# Patient Record
Sex: MALE | Race: WHITE | Employment: FULL TIME | URBAN - METROPOLITAN AREA
[De-identification: names, ages, dates, MRNs, and addresses within clinical notes are randomized per-mention and may not be internally consistent; named-entity substitution may affect disease eponyms.]

---

## 2017-01-30 ENCOUNTER — OFFICE VISIT (OUTPATIENT)
Dept: INTERNAL MEDICINE CLINIC | Facility: CLINIC | Age: 47
End: 2017-01-30

## 2017-01-30 VITALS
SYSTOLIC BLOOD PRESSURE: 120 MMHG | HEIGHT: 69 IN | BODY MASS INDEX: 25.62 KG/M2 | TEMPERATURE: 98 F | HEART RATE: 73 BPM | DIASTOLIC BLOOD PRESSURE: 80 MMHG | WEIGHT: 173 LBS

## 2017-01-30 DIAGNOSIS — R19.7 DIARRHEA, UNSPECIFIED TYPE: Primary | ICD-10-CM

## 2017-01-30 PROCEDURE — 99213 OFFICE O/P EST LOW 20 MIN: CPT | Performed by: INTERNAL MEDICINE

## 2017-01-30 PROCEDURE — 99212 OFFICE O/P EST SF 10 MIN: CPT | Performed by: INTERNAL MEDICINE

## 2017-01-30 RX ORDER — OMEPRAZOLE 20 MG/1
CAPSULE, DELAYED RELEASE ORAL
Qty: 30 CAPSULE | Refills: 0 | Status: SHIPPED | OUTPATIENT
Start: 2017-01-30 | End: 2017-05-17

## 2017-01-30 NOTE — PATIENT INSTRUCTIONS
Please drink extra fluids to avoid dehydration. Follow a bland diet and avoid milk and dairy products. Use Imodium to help control diarrhea. Call if not soon better.

## 2017-01-30 NOTE — PROGRESS NOTES
Tyler Omer is a 42year old male. Patient presents with:  Diarrhea  Medication Request: Pt requesting refill on omeprazole    HPI:   Since Saturday morning, 2 days ago, he has had persistent frequent watery diarrhea.   In addition, he also notes mild na prescription refill for omeprazole sent to pharmacy. He will follow-up with his usual PCP soon. The patient indicates understanding of these issues and agrees to the plan. The patient is asked to return as needed.     Gladys Jerez MD  1/30/2017  3:

## 2017-05-17 ENCOUNTER — OFFICE VISIT (OUTPATIENT)
Dept: INTERNAL MEDICINE CLINIC | Facility: CLINIC | Age: 47
End: 2017-05-17

## 2017-05-17 ENCOUNTER — LAB ENCOUNTER (OUTPATIENT)
Dept: LAB | Facility: HOSPITAL | Age: 47
End: 2017-05-17
Attending: INTERNAL MEDICINE
Payer: COMMERCIAL

## 2017-05-17 VITALS
WEIGHT: 173 LBS | BODY MASS INDEX: 25.62 KG/M2 | HEIGHT: 69 IN | HEART RATE: 78 BPM | SYSTOLIC BLOOD PRESSURE: 105 MMHG | RESPIRATION RATE: 18 BRPM | DIASTOLIC BLOOD PRESSURE: 69 MMHG

## 2017-05-17 DIAGNOSIS — Z00.00 ROUTINE HEALTH MAINTENANCE: ICD-10-CM

## 2017-05-17 DIAGNOSIS — G89.29 CHRONIC LOW BACK PAIN, UNSPECIFIED BACK PAIN LATERALITY, WITH SCIATICA PRESENCE UNSPECIFIED: ICD-10-CM

## 2017-05-17 DIAGNOSIS — H66.91 OTITIS, RIGHT: ICD-10-CM

## 2017-05-17 DIAGNOSIS — Z00.00 ROUTINE HEALTH MAINTENANCE: Primary | ICD-10-CM

## 2017-05-17 DIAGNOSIS — K21.9 GASTROESOPHAGEAL REFLUX DISEASE WITHOUT ESOPHAGITIS: ICD-10-CM

## 2017-05-17 DIAGNOSIS — I10 ESSENTIAL HYPERTENSION: ICD-10-CM

## 2017-05-17 DIAGNOSIS — M54.5 CHRONIC LOW BACK PAIN, UNSPECIFIED BACK PAIN LATERALITY, WITH SCIATICA PRESENCE UNSPECIFIED: ICD-10-CM

## 2017-05-17 PROCEDURE — 36415 COLL VENOUS BLD VENIPUNCTURE: CPT

## 2017-05-17 PROCEDURE — 80048 BASIC METABOLIC PNL TOTAL CA: CPT

## 2017-05-17 PROCEDURE — 99396 PREV VISIT EST AGE 40-64: CPT | Performed by: INTERNAL MEDICINE

## 2017-05-17 PROCEDURE — 99213 OFFICE O/P EST LOW 20 MIN: CPT | Performed by: INTERNAL MEDICINE

## 2017-05-17 PROCEDURE — 85025 COMPLETE CBC W/AUTO DIFF WBC: CPT

## 2017-05-17 PROCEDURE — 83036 HEMOGLOBIN GLYCOSYLATED A1C: CPT

## 2017-05-17 PROCEDURE — 84443 ASSAY THYROID STIM HORMONE: CPT

## 2017-05-17 RX ORDER — OMEPRAZOLE 20 MG/1
CAPSULE, DELAYED RELEASE ORAL
Qty: 30 CAPSULE | Refills: 5 | Status: SHIPPED | OUTPATIENT
Start: 2017-05-17 | End: 2017-11-27

## 2017-05-17 RX ORDER — AZITHROMYCIN 250 MG/1
TABLET, FILM COATED ORAL
Qty: 6 TABLET | Refills: 0 | Status: SHIPPED | OUTPATIENT
Start: 2017-05-17 | End: 2017-12-16 | Stop reason: ALTCHOICE

## 2017-05-17 RX ORDER — LISINOPRIL AND HYDROCHLOROTHIAZIDE 20; 12.5 MG/1; MG/1
1 TABLET ORAL
Qty: 30 TABLET | Refills: 5 | Status: SHIPPED | OUTPATIENT
Start: 2017-05-17 | End: 2017-11-27

## 2017-05-17 RX ORDER — TRAMADOL HYDROCHLORIDE 50 MG/1
50 TABLET ORAL 2 TIMES DAILY PRN
Qty: 60 TABLET | Refills: 3 | Status: SHIPPED | OUTPATIENT
Start: 2017-05-17 | End: 2017-12-16

## 2017-05-17 NOTE — PROGRESS NOTES
HPI:    Patient ID: April Coronado is a 42year old male. HPI Comments: Pt is here for a physical.    The past few days his ear has been bothering her. His nose has been congested. His right ear hurts.   If he doesn't take the omeprazole he gets heartb Negative for food allergies. Neurological: Negative for headaches. Psychiatric/Behavioral: Negative for depressed mood. The patient is not nervous/anxious.              Current Outpatient Prescriptions:  omeprazole 20 MG Oral Capsule Delayed Release ALEXANDER Routine health maintenance  Unremarkable exam.  Pt is up to date on immunizations. Counseled regarding exercise and healthy diet. Recommended annual flu vaccine  - BMP; Future  - CBC With Differential With Platelet;  Future  - TSH W Reflex To Free T4 [E];

## 2017-05-17 NOTE — PATIENT INSTRUCTIONS
Non-fasting labs today. Take Sudafed (pseudoephedrine) as needed. You get it from the pharmacist, because it is not over-the-counter.

## 2017-11-27 DIAGNOSIS — K21.9 GASTROESOPHAGEAL REFLUX DISEASE WITHOUT ESOPHAGITIS: ICD-10-CM

## 2017-11-27 DIAGNOSIS — I10 ESSENTIAL HYPERTENSION: ICD-10-CM

## 2017-11-27 RX ORDER — OMEPRAZOLE 20 MG/1
CAPSULE, DELAYED RELEASE ORAL
Qty: 30 CAPSULE | Refills: 0 | Status: SHIPPED | OUTPATIENT
Start: 2017-11-27 | End: 2017-11-27

## 2017-11-27 RX ORDER — LISINOPRIL AND HYDROCHLOROTHIAZIDE 20; 12.5 MG/1; MG/1
TABLET ORAL
Qty: 30 TABLET | Refills: 0 | Status: SHIPPED | OUTPATIENT
Start: 2017-11-27 | End: 2017-12-16

## 2017-11-27 NOTE — TELEPHONE ENCOUNTER
Pt called in requesting a refill for the following medications:  Pt has an upcoming appt scheduled for 12/16. Current Outpatient Prescriptions:   •  Lisinopril-Hydrochlorothiazide 20-12.5 MG Oral Tab, Take 1 tablet by mouth once daily. , Disp: 30 table

## 2017-11-28 RX ORDER — OMEPRAZOLE 20 MG/1
CAPSULE, DELAYED RELEASE ORAL
Qty: 90 CAPSULE | Refills: 0 | Status: SHIPPED | OUTPATIENT
Start: 2017-11-28 | End: 2017-12-16

## 2017-12-16 ENCOUNTER — OFFICE VISIT (OUTPATIENT)
Dept: INTERNAL MEDICINE CLINIC | Facility: CLINIC | Age: 47
End: 2017-12-16

## 2017-12-16 VITALS
HEIGHT: 69 IN | WEIGHT: 174 LBS | BODY MASS INDEX: 25.77 KG/M2 | DIASTOLIC BLOOD PRESSURE: 77 MMHG | HEART RATE: 93 BPM | SYSTOLIC BLOOD PRESSURE: 116 MMHG | RESPIRATION RATE: 18 BRPM

## 2017-12-16 DIAGNOSIS — G89.29 CHRONIC LOW BACK PAIN, UNSPECIFIED BACK PAIN LATERALITY, WITH SCIATICA PRESENCE UNSPECIFIED: Primary | ICD-10-CM

## 2017-12-16 DIAGNOSIS — M54.5 CHRONIC LOW BACK PAIN, UNSPECIFIED BACK PAIN LATERALITY, WITH SCIATICA PRESENCE UNSPECIFIED: Primary | ICD-10-CM

## 2017-12-16 DIAGNOSIS — F51.01 PRIMARY INSOMNIA: ICD-10-CM

## 2017-12-16 DIAGNOSIS — K21.9 GASTROESOPHAGEAL REFLUX DISEASE WITHOUT ESOPHAGITIS: ICD-10-CM

## 2017-12-16 DIAGNOSIS — I10 ESSENTIAL HYPERTENSION: ICD-10-CM

## 2017-12-16 PROCEDURE — 99214 OFFICE O/P EST MOD 30 MIN: CPT | Performed by: INTERNAL MEDICINE

## 2017-12-16 PROCEDURE — 99212 OFFICE O/P EST SF 10 MIN: CPT | Performed by: INTERNAL MEDICINE

## 2017-12-16 RX ORDER — LISINOPRIL AND HYDROCHLOROTHIAZIDE 20; 12.5 MG/1; MG/1
TABLET ORAL
Qty: 90 TABLET | Refills: 1 | Status: SHIPPED | OUTPATIENT
Start: 2017-12-16 | End: 2018-02-17

## 2017-12-16 RX ORDER — TRAZODONE HYDROCHLORIDE 50 MG/1
50 TABLET ORAL NIGHTLY
Qty: 30 TABLET | Refills: 3 | Status: SHIPPED | OUTPATIENT
Start: 2017-12-16 | End: 2018-02-17

## 2017-12-16 RX ORDER — TRAMADOL HYDROCHLORIDE 50 MG/1
50 TABLET ORAL 2 TIMES DAILY PRN
Qty: 60 TABLET | Refills: 3 | Status: SHIPPED | OUTPATIENT
Start: 2017-12-16 | End: 2019-02-07

## 2017-12-16 RX ORDER — OMEPRAZOLE 20 MG/1
CAPSULE, DELAYED RELEASE ORAL
Qty: 90 CAPSULE | Refills: 1 | Status: SHIPPED | OUTPATIENT
Start: 2017-12-16 | End: 2018-05-29

## 2017-12-16 NOTE — ASSESSMENT & PLAN NOTE
Patient has tried Tylenol PM, melatonin, and other over-the-counter products, but they have not been effective or have given him side effects. He has chronic persistent insomnia. He would like prescription medication.  We will try trazodone first.  He will

## 2017-12-16 NOTE — PROGRESS NOTES
HPI:    Patient ID: Yohana Salazar is a 41year old male. Pt has chronic insomnia. He has tried the over-the-counter meds. He has trouble falling asleep, but he wakes during the night.   He had restless legs, but that has resolved when he had his veins Yes           0.0 oz/week     Comment: occasionally    Family History   Problem Relation Age of Onset   • Diabetes Father    • Heart Disorder Father    • Cancer Mother      uterine       . /77 (BP Location: Right arm, Patient Position: Sitting, Cuff S needed for Pain. omeprazole 20 MG Oral Capsule Delayed Release 90 capsule 1      Sig: TAKE 1 CAPSULE BY MOUTH EVERY MORNING      Lisinopril-Hydrochlorothiazide 20-12.5 MG Oral Tab 90 tablet 1      Si daily.       TraZODone HCl 50 MG Oral Tab 30 tab

## 2017-12-27 ENCOUNTER — TELEPHONE (OUTPATIENT)
Dept: INTERNAL MEDICINE CLINIC | Facility: CLINIC | Age: 47
End: 2017-12-27

## 2017-12-27 NOTE — TELEPHONE ENCOUNTER
Patient told to call if sleeping medication is not working. Patient said that he even tried taking 2 trazodone but it is  not putting him to sleep but seems to be keeping him up. Asking if there  Is some thing else he can take?

## 2017-12-27 NOTE — TELEPHONE ENCOUNTER
Pt returning call. Pt states took Trazadone prescribed by LV on 12/16/17 as prescribed for 2 nights and then decided to take 2 tabs nightly since was not working. States just stopped it 2 days ago as seemed to be making insomnia worse.  States sleeping issu

## 2017-12-28 RX ORDER — ZOLPIDEM TARTRATE 5 MG/1
5 TABLET ORAL NIGHTLY PRN
Qty: 30 TABLET | Refills: 0 | Status: SHIPPED
Start: 2017-12-28 | End: 2018-02-17

## 2018-01-12 ENCOUNTER — TELEPHONE (OUTPATIENT)
Dept: INTERNAL MEDICINE CLINIC | Facility: CLINIC | Age: 48
End: 2018-01-12

## 2018-01-12 NOTE — TELEPHONE ENCOUNTER
Pt states ambien 5mg did not work well he increased himself to 10 mg which makes him a \" lttle sleepy but not sleeping through the night\"  Medication was refilled 12/28/17 and now only has a few pills left.   Instructed to never self medicate and change d

## 2018-01-12 NOTE — TELEPHONE ENCOUNTER
Pt requesting refill on med below and states almost out of the  medication and states he thinks he need to change the dose to 15 mg or 20 mg due to the Pt taking more than 1 tablet and running out of pills faster.      •  Zolpidem Tartrate (AMBIEN) 5 MG Ora

## 2018-02-13 ENCOUNTER — NURSE TRIAGE (OUTPATIENT)
Dept: OTHER | Age: 48
End: 2018-02-13

## 2018-02-13 NOTE — TELEPHONE ENCOUNTER
Action Requested: Summary for Provider     []  Critical Lab, Recommendations Needed  [] Need Additional Advice  []   FYI    []   Need Orders  [] Need Medications Sent to Pharmacy  [x]  Other     SUMMARY: Appt made for today for evaluation     Patient state

## 2018-02-17 PROBLEM — J30.9 ALLERGIC RHINITIS: Status: ACTIVE | Noted: 2018-02-17

## 2018-02-17 NOTE — PROGRESS NOTES
HPI:    Patient ID: Aniket Huerta is a 41year old male. Pt c/o chronic insomnia  He tried the trazadone which caused side effects, then the Ambien 5. He ended up taking 10 mg of Ambien. It helped him fall asleep, but he didn't stay asleep.   He is no Allergies:  Levaquin [Levofloxa*    Rash     Smoking status: Never Smoker                                                              Smokeless tobacco: Never Used                      Alcohol use: Yes           0.0 oz/week     Comment: occasionally Refills    Lisinopril-Hydrochlorothiazide 20-12.5 MG Oral Tab 90 tablet 1      Si daily. Zolpidem Tartrate ER (AMBIEN CR) 12.5 MG Oral Tab CR 30 tablet 5      Sig: Take 1 tablet (12.5 mg total) by mouth nightly as needed for Sleep.

## 2018-02-17 NOTE — ASSESSMENT & PLAN NOTE
Improved with Ambien 10 mg, but he does not sleep through the night. Will change to long-acting Ambien.

## 2018-02-17 NOTE — ASSESSMENT & PLAN NOTE
Patient gets relief of his symptoms with Claritin, however he does not like to pay for it. I advised him to get generic.

## 2018-05-29 ENCOUNTER — OFFICE VISIT (OUTPATIENT)
Dept: INTERNAL MEDICINE CLINIC | Facility: CLINIC | Age: 48
End: 2018-05-29

## 2018-05-29 ENCOUNTER — NURSE TRIAGE (OUTPATIENT)
Dept: OTHER | Age: 48
End: 2018-05-29

## 2018-05-29 VITALS
DIASTOLIC BLOOD PRESSURE: 64 MMHG | HEART RATE: 82 BPM | WEIGHT: 163.31 LBS | TEMPERATURE: 98 F | HEIGHT: 69 IN | BODY MASS INDEX: 24.19 KG/M2 | SYSTOLIC BLOOD PRESSURE: 96 MMHG

## 2018-05-29 DIAGNOSIS — K21.9 GASTROESOPHAGEAL REFLUX DISEASE WITHOUT ESOPHAGITIS: ICD-10-CM

## 2018-05-29 DIAGNOSIS — J06.9 UPPER RESPIRATORY TRACT INFECTION, UNSPECIFIED TYPE: ICD-10-CM

## 2018-05-29 DIAGNOSIS — K64.4 EXTERNAL HEMORRHOIDS: Primary | ICD-10-CM

## 2018-05-29 DIAGNOSIS — I10 ESSENTIAL HYPERTENSION: ICD-10-CM

## 2018-05-29 PROCEDURE — 99212 OFFICE O/P EST SF 10 MIN: CPT | Performed by: INTERNAL MEDICINE

## 2018-05-29 PROCEDURE — 99214 OFFICE O/P EST MOD 30 MIN: CPT | Performed by: INTERNAL MEDICINE

## 2018-05-29 RX ORDER — OMEPRAZOLE 20 MG/1
CAPSULE, DELAYED RELEASE ORAL
Qty: 90 CAPSULE | Refills: 1 | Status: SHIPPED | OUTPATIENT
Start: 2018-05-29 | End: 2019-07-12

## 2018-05-29 RX ORDER — LISINOPRIL AND HYDROCHLOROTHIAZIDE 20; 12.5 MG/1; MG/1
TABLET ORAL
Qty: 90 TABLET | Refills: 1 | Status: SHIPPED | OUTPATIENT
Start: 2018-05-29 | End: 2021-02-01

## 2018-05-29 NOTE — TELEPHONE ENCOUNTER
Action Requested: Summary for Provider     []  Critical Lab, Recommendations Needed  [x] Need Additional Advice  []   FYI    []   Need Orders  [] Need Medications Sent to Pharmacy  []  Other     SUMMARY: appt scheduled today for \"bulging hemorrhoid\".  Pt

## 2018-05-29 NOTE — PROGRESS NOTES
HPI:    Patient ID: Morteza Lezama is a 41year old male. Pt has had hemorrhoids in the past.  This last one has been bad for a week. Hemorrhoids   This is a recurrent problem. The current episode started in the past 7 days.  The problem occurs co MORNING Disp: 90 capsule Rfl: 1       Allergies:  Levaquin [Levofloxa*    RASH     Smoking status: Never Smoker                                                              Smokeless tobacco: Never Used                      Alcohol use: Yes           0.0 o ibuprofen.

## 2018-06-01 ENCOUNTER — OFFICE VISIT (OUTPATIENT)
Dept: SURGERY | Facility: CLINIC | Age: 48
End: 2018-06-01

## 2018-06-01 VITALS
WEIGHT: 163 LBS | SYSTOLIC BLOOD PRESSURE: 112 MMHG | BODY MASS INDEX: 23.6 KG/M2 | HEIGHT: 69.5 IN | DIASTOLIC BLOOD PRESSURE: 79 MMHG | TEMPERATURE: 97 F | HEART RATE: 73 BPM

## 2018-06-01 DIAGNOSIS — K64.2 GRADE III HEMORRHOIDS: Primary | ICD-10-CM

## 2018-06-01 PROCEDURE — 99203 OFFICE O/P NEW LOW 30 MIN: CPT | Performed by: SURGERY

## 2018-06-01 PROCEDURE — 99212 OFFICE O/P EST SF 10 MIN: CPT | Performed by: SURGERY

## 2018-06-01 NOTE — PROGRESS NOTES
HPI:    Patient ID: Lexii Cardenas is a 41year old male presenting with Patient presents with:  Hemorrhoids: Patient referred by PCP Dr. Krishna Robles for hemorrhoids. 2 weeks. Swelling has not decreased. Preparation H, sitz baths with little to no relief.  Sh Zolpidem Tartrate ER (AMBIEN CR) 12.5 MG Oral Tab CR Take 1 tablet (12.5 mg total) by mouth nightly as needed for Sleep. Disp: 30 tablet Rfl: 5   TraMADol HCl 50 MG Oral Tab Take 1 tablet (50 mg total) by mouth 2 (two) times daily as needed for Pain.  Disp: Discussed in detail with pt and options reviewed - literature provided. For short term treatment of pain and swelling, I recommend sitz baths and topical hydrocortisone cream and tucks pads.   Intermittent stool softener therapy and laxatives may be of bertrand

## 2018-06-13 ENCOUNTER — TELEPHONE (OUTPATIENT)
Dept: SURGERY | Facility: CLINIC | Age: 48
End: 2018-06-13

## 2018-06-13 NOTE — TELEPHONE ENCOUNTER
Contacted patient. He states he has been doing all of the things Dr. Erin Malcolm suggested from last office visit (stool softeners, high fiber diet, increased fluids, sitz baths, not sitting on commode for long periods of time, hydrocortisone cream, tucks pads, even a lidocaine cream) with no relief. He states the hemorrhoid is still there and he is irritated. He has to sit for most of the day due to work. He would like Dr. Erin Malcolm to review his chart and see what his options are as he would like this \"taken care of\". I offered patient appointment on Friday 06/15 for reevaluation. Patient states he does not want to keep coming back to follow up just to get checked. He needs to take time off work and he has very little PTO time left. I informed patient I would relay this message to Dr. Erin Malcolm and will call him back with his recommendations. Patient was thankful, all questions answered at this time.

## 2018-06-13 NOTE — TELEPHONE ENCOUNTER
appt 6-1-18 for a hemorrhoid. Pt still has the hemorrhoid for over a month.   Pt is requesting a call back

## 2018-06-16 ENCOUNTER — HOSPITAL ENCOUNTER (EMERGENCY)
Facility: HOSPITAL | Age: 48
Discharge: HOME | End: 2018-06-16
Attending: EMERGENCY MEDICINE
Payer: COMMERCIAL

## 2018-06-16 ENCOUNTER — APPOINTMENT (OUTPATIENT)
Dept: CT IMAGING | Facility: HOSPITAL | Age: 48
End: 2018-06-16
Attending: EMERGENCY MEDICINE
Payer: COMMERCIAL

## 2018-06-16 VITALS
OXYGEN SATURATION: 96 % | HEIGHT: 70 IN | TEMPERATURE: 98 F | WEIGHT: 165 LBS | HEART RATE: 80 BPM | RESPIRATION RATE: 15 BRPM | SYSTOLIC BLOOD PRESSURE: 106 MMHG | DIASTOLIC BLOOD PRESSURE: 78 MMHG | BODY MASS INDEX: 23.62 KG/M2

## 2018-06-16 DIAGNOSIS — N23 RENAL COLIC: Primary | ICD-10-CM

## 2018-06-16 DIAGNOSIS — N20.1 URETEROLITHIASIS: ICD-10-CM

## 2018-06-16 PROCEDURE — 80076 HEPATIC FUNCTION PANEL: CPT | Performed by: EMERGENCY MEDICINE

## 2018-06-16 PROCEDURE — 74176 CT ABD & PELVIS W/O CONTRAST: CPT | Performed by: EMERGENCY MEDICINE

## 2018-06-16 PROCEDURE — 96361 HYDRATE IV INFUSION ADD-ON: CPT

## 2018-06-16 PROCEDURE — 81001 URINALYSIS AUTO W/SCOPE: CPT | Performed by: EMERGENCY MEDICINE

## 2018-06-16 PROCEDURE — 99284 EMERGENCY DEPT VISIT MOD MDM: CPT

## 2018-06-16 PROCEDURE — 83690 ASSAY OF LIPASE: CPT | Performed by: EMERGENCY MEDICINE

## 2018-06-16 PROCEDURE — 87086 URINE CULTURE/COLONY COUNT: CPT | Performed by: EMERGENCY MEDICINE

## 2018-06-16 PROCEDURE — 80048 BASIC METABOLIC PNL TOTAL CA: CPT | Performed by: EMERGENCY MEDICINE

## 2018-06-16 PROCEDURE — 85025 COMPLETE CBC W/AUTO DIFF WBC: CPT | Performed by: EMERGENCY MEDICINE

## 2018-06-16 PROCEDURE — 96375 TX/PRO/DX INJ NEW DRUG ADDON: CPT

## 2018-06-16 PROCEDURE — 96374 THER/PROPH/DIAG INJ IV PUSH: CPT

## 2018-06-16 RX ORDER — HYDROCODONE BITARTRATE AND ACETAMINOPHEN 5; 325 MG/1; MG/1
1 TABLET ORAL EVERY 6 HOURS PRN
Qty: 10 TABLET | Refills: 0 | Status: SHIPPED | OUTPATIENT
Start: 2018-06-16 | End: 2018-06-19

## 2018-06-16 RX ORDER — KETOROLAC TROMETHAMINE 30 MG/ML
30 INJECTION, SOLUTION INTRAMUSCULAR; INTRAVENOUS ONCE
Status: COMPLETED | OUTPATIENT
Start: 2018-06-16 | End: 2018-06-16

## 2018-06-16 RX ORDER — TAMSULOSIN HYDROCHLORIDE 0.4 MG/1
0.4 CAPSULE ORAL DAILY
Qty: 7 CAPSULE | Refills: 0 | Status: SHIPPED | OUTPATIENT
Start: 2018-06-16 | End: 2018-06-19

## 2018-06-16 RX ORDER — ONDANSETRON 2 MG/ML
4 INJECTION INTRAMUSCULAR; INTRAVENOUS ONCE
Status: COMPLETED | OUTPATIENT
Start: 2018-06-16 | End: 2018-06-16

## 2018-06-16 RX ORDER — HYDROCODONE BITARTRATE AND ACETAMINOPHEN 5; 325 MG/1; MG/1
2 TABLET ORAL ONCE
Status: COMPLETED | OUTPATIENT
Start: 2018-06-16 | End: 2018-06-16

## 2018-06-16 RX ORDER — CEPHALEXIN 500 MG/1
500 CAPSULE ORAL 2 TIMES DAILY
Qty: 14 CAPSULE | Refills: 0 | Status: SHIPPED | OUTPATIENT
Start: 2018-06-16 | End: 2018-06-16

## 2018-06-16 NOTE — ED PROVIDER NOTES
Patient Seen in: [facility] Emergency Department    History   Patient presents with:  Abdomen/Flank Pain (GI/)    Stated Complaint: Right sided back pain    HPI    42 y/o male w/ N/V/D yesterday now today w/ more N/V but new onset R flank pain r mid and lower abdomen areas. No guarding or peritoneal signs. Musculoskeletal: Normal range of motion. No edema or tenderness. Neurological: Alert and oriented to person, place, and time. Skin: Skin is warm and dry.    Nursing note and vitals reviewed INDICATIONS: Patient is complains of right sided flank and groin pain since early this am. Generalized abdominal and pelvic pain. History of gastroesophageal reflux disease and hypertension.  Leukocytosis with a  TECHNIQUE: CT images of the abdomen and pelv mass.  Pelvic organs appropriate for patient age. BONES:   No significant bony lesion or fracture. Disc space narrowing with chronic degenerative disc disease L5-S1. No acute osseous abnormality LUNG BASES: Dependant atelectasis and or scarring.  OTHER: Ne 0    tamsulosin HCl (FLOMAX) 0.4 MG Oral Cap  Take 1 capsule (0.4 mg total) by mouth daily.   Qty: 7 capsule Refills: 0

## 2018-06-18 ENCOUNTER — TELEPHONE (OUTPATIENT)
Dept: SURGERY | Facility: CLINIC | Age: 48
End: 2018-06-18

## 2018-06-18 NOTE — TELEPHONE ENCOUNTER
pt called. Was seen at [address] [place] [address] ED on Saturday 6/16/18, dx w/kidney stones. CT shows 6x7mm partially obstructing  calculus. Please advise  Thank you.

## 2018-06-18 NOTE — TELEPHONE ENCOUNTER
Spoke to patient. Offered f/u tomorrow @ 0800 with Dr. Lizzy Kednall. Patient agreed, appointment made.

## 2018-06-19 ENCOUNTER — TELEPHONE (OUTPATIENT)
Dept: SURGERY | Facility: CLINIC | Age: 48
End: 2018-06-19

## 2018-06-19 ENCOUNTER — OFFICE VISIT (OUTPATIENT)
Dept: SURGERY | Facility: CLINIC | Age: 48
End: 2018-06-19

## 2018-06-19 ENCOUNTER — APPOINTMENT (OUTPATIENT)
Dept: LAB | Facility: HOSPITAL | Age: 48
End: 2018-06-19
Attending: UROLOGY
Payer: COMMERCIAL

## 2018-06-19 ENCOUNTER — HOSPITAL ENCOUNTER (OUTPATIENT)
Dept: GENERAL RADIOLOGY | Facility: HOSPITAL | Age: 48
Discharge: HOME | End: 2018-06-19
Attending: UROLOGY
Payer: COMMERCIAL

## 2018-06-19 VITALS
RESPIRATION RATE: 16 BRPM | SYSTOLIC BLOOD PRESSURE: 111 MMHG | WEIGHT: 165 LBS | HEART RATE: 79 BPM | DIASTOLIC BLOOD PRESSURE: 75 MMHG | HEIGHT: 69.5 IN | BODY MASS INDEX: 23.89 KG/M2

## 2018-06-19 DIAGNOSIS — Z01.818 PREOP EXAMINATION: ICD-10-CM

## 2018-06-19 DIAGNOSIS — N20.0 KIDNEY STONES: Primary | ICD-10-CM

## 2018-06-19 DIAGNOSIS — Z00.00 ROUTINE HEALTH MAINTENANCE: ICD-10-CM

## 2018-06-19 DIAGNOSIS — N20.0 KIDNEY STONES: ICD-10-CM

## 2018-06-19 DIAGNOSIS — Z01.818 PREOP EXAMINATION: Primary | ICD-10-CM

## 2018-06-19 PROCEDURE — 99244 OFF/OP CNSLTJ NEW/EST MOD 40: CPT | Performed by: UROLOGY

## 2018-06-19 PROCEDURE — 84443 ASSAY THYROID STIM HORMONE: CPT

## 2018-06-19 PROCEDURE — 93010 ELECTROCARDIOGRAM REPORT: CPT | Performed by: UROLOGY

## 2018-06-19 PROCEDURE — 80053 COMPREHEN METABOLIC PANEL: CPT

## 2018-06-19 PROCEDURE — 80061 LIPID PANEL: CPT

## 2018-06-19 PROCEDURE — 36415 COLL VENOUS BLD VENIPUNCTURE: CPT

## 2018-06-19 PROCEDURE — 74019 RADEX ABDOMEN 2 VIEWS: CPT | Performed by: UROLOGY

## 2018-06-19 PROCEDURE — 99212 OFFICE O/P EST SF 10 MIN: CPT | Performed by: UROLOGY

## 2018-06-19 PROCEDURE — 93005 ELECTROCARDIOGRAM TRACING: CPT

## 2018-06-19 RX ORDER — TAMSULOSIN HYDROCHLORIDE 0.4 MG/1
0.4 CAPSULE ORAL DAILY
Qty: 14 CAPSULE | Refills: 0 | Status: SHIPPED | OUTPATIENT
Start: 2018-06-19 | End: 2018-07-03

## 2018-06-19 RX ORDER — HYDROCODONE BITARTRATE AND ACETAMINOPHEN 5; 325 MG/1; MG/1
2 TABLET ORAL EVERY 6 HOURS PRN
Qty: 20 TABLET | Refills: 0 | Status: SHIPPED | OUTPATIENT
Start: 2018-06-19 | End: 2019-09-10 | Stop reason: ALTCHOICE

## 2018-06-19 NOTE — PROGRESS NOTES
SUBJECTIVE:  Rafael Valenzuela is a 41year old male who is a new patient to our department, and presents with a chief complaint of urinary stone. He states that the problem is unchanged.  Symptoms include was in the ER 6/16/2018 with severe right flank pain weight gain, weight loss, fever, night sweats, bone pain, malaise and fatigue. Positive for:  None.   ALl other ROS reviewed and otherwise normal.    OBJECTIVE:  /75 (BP Location: Right arm, Patient Position: Sitting, Cuff Size: adult)   Pulse 79   Re emergency room right away. –Erectile dysfunction: This has not been responsive in the past oral medication and he was using intracavernosal injection therapy under the direction of an outside urologist in the past with good control.   I refilled Trimix sta

## 2018-06-19 NOTE — TELEPHONE ENCOUNTER
Is it ok to schedule these procedures or is the patient required to come in to discuss further? Thanks.

## 2018-07-02 DIAGNOSIS — K21.9 GASTROESOPHAGEAL REFLUX DISEASE WITHOUT ESOPHAGITIS: ICD-10-CM

## 2018-07-02 RX ORDER — OMEPRAZOLE 20 MG/1
CAPSULE, DELAYED RELEASE ORAL
Qty: 90 CAPSULE | Refills: 0 | Status: SHIPPED | OUTPATIENT
Start: 2018-07-02 | End: 2018-07-05

## 2018-07-02 NOTE — TELEPHONE ENCOUNTER
Gastrointestional Medication Protocol Passed7/2 10:39 AM   Appointment in past 12 or next 3 months     Medication refilled for 90 days per protocol.

## 2018-07-05 ENCOUNTER — OFFICE VISIT (OUTPATIENT)
Dept: INTERNAL MEDICINE CLINIC | Facility: CLINIC | Age: 48
End: 2018-07-05

## 2018-07-05 VITALS
BODY MASS INDEX: 23.05 KG/M2 | HEART RATE: 91 BPM | WEIGHT: 159.19 LBS | TEMPERATURE: 98 F | HEIGHT: 69.5 IN | SYSTOLIC BLOOD PRESSURE: 97 MMHG | RESPIRATION RATE: 18 BRPM | DIASTOLIC BLOOD PRESSURE: 66 MMHG

## 2018-07-05 DIAGNOSIS — N20.0 KIDNEY STONES: ICD-10-CM

## 2018-07-05 DIAGNOSIS — Z00.00 ROUTINE HEALTH MAINTENANCE: Primary | ICD-10-CM

## 2018-07-05 DIAGNOSIS — I10 ESSENTIAL HYPERTENSION: ICD-10-CM

## 2018-07-05 DIAGNOSIS — R79.89 ELEVATED LIVER FUNCTION TESTS: ICD-10-CM

## 2018-07-05 PROCEDURE — 99396 PREV VISIT EST AGE 40-64: CPT | Performed by: INTERNAL MEDICINE

## 2018-07-05 NOTE — ASSESSMENT & PLAN NOTE
This may be secondary to [place] pt to avoid norco, nsaids, and alcohol. Recheck liver tests in September.

## 2018-07-05 NOTE — PROGRESS NOTES
HPI:    Patient ID: Kennedy Womack is a 41year old male. Pt is here for a physical.    He will have lithotripsy for kidney stones. Review of Systems   Constitutional: Negative for chills and fever.    HENT: Negative for congestion, ear roberth Phentolamine Mesylate 0.6 mg/ ml Disp: 5 mL Rfl: 6   hydrocortisone (ANUSOL-HC) 2.5 % Rectal Cream Place 1 Application rectally 2 (two) times daily.  Disp: 1 Tube Rfl: 3   clotrimazole-betamethasone 1-0.05 % External Cream DONNIE EXT AA BID Disp:  Rfl: 0 Lymphadenopathy:     He has no cervical adenopathy. Right: No inguinal adenopathy present. Left: No inguinal adenopathy present. Neurological: He is alert and oriented to person, place, and time. No cranial nerve deficit.    Skin: Skin is

## 2018-07-11 ENCOUNTER — APPOINTMENT (OUTPATIENT)
Dept: CT IMAGING | Facility: HOSPITAL | Age: 48
End: 2018-07-11
Attending: EMERGENCY MEDICINE
Payer: COMMERCIAL

## 2018-07-11 ENCOUNTER — TELEPHONE (OUTPATIENT)
Dept: SURGERY | Facility: CLINIC | Age: 48
End: 2018-07-11

## 2018-07-11 ENCOUNTER — HOSPITAL ENCOUNTER (EMERGENCY)
Facility: HOSPITAL | Age: 48
Discharge: HOME | End: 2018-07-11
Attending: EMERGENCY MEDICINE
Payer: COMMERCIAL

## 2018-07-11 VITALS
RESPIRATION RATE: 18 BRPM | HEART RATE: 85 BPM | DIASTOLIC BLOOD PRESSURE: 91 MMHG | OXYGEN SATURATION: 99 % | HEIGHT: 70 IN | TEMPERATURE: 98 F | WEIGHT: 160 LBS | BODY MASS INDEX: 22.9 KG/M2 | SYSTOLIC BLOOD PRESSURE: 135 MMHG

## 2018-07-11 DIAGNOSIS — Z98.890 STATUS POST LASER LITHOTRIPSY OF URETERAL CALCULUS: ICD-10-CM

## 2018-07-11 DIAGNOSIS — N20.1 RIGHT URETERAL STONE: Primary | ICD-10-CM

## 2018-07-11 DIAGNOSIS — N20.0 KIDNEY STONES: Primary | ICD-10-CM

## 2018-07-11 LAB
ANION GAP SERPL CALC-SCNC: 10 MMOL/L (ref 0–18)
BASOPHILS # BLD: 0 K/UL (ref 0–0.2)
BASOPHILS NFR BLD: 0 %
BILIRUB UR QL: NEGATIVE
BUN SERPL-MCNC: 20 MG/DL (ref 8–20)
BUN/CREAT SERPL: 16.5 (ref 10–20)
CALCIUM SERPL-MCNC: 8.9 MG/DL (ref 8.5–10.5)
CHLORIDE SERPL-SCNC: 103 MMOL/L (ref 95–110)
CO2 SERPL-SCNC: 25 MMOL/L (ref 22–32)
CREAT SERPL-MCNC: 1.21 MG/DL (ref 0.5–1.5)
EOSINOPHIL # BLD: 0 K/UL (ref 0–0.7)
EOSINOPHIL NFR BLD: 0 %
ERYTHROCYTE [DISTWIDTH] IN BLOOD BY AUTOMATED COUNT: 12.9 % (ref 11–15)
GLUCOSE SERPL-MCNC: 155 MG/DL (ref 70–99)
GLUCOSE UR-MCNC: NEGATIVE MG/DL
HCT VFR BLD AUTO: 43.9 % (ref 41–52)
HGB BLD-MCNC: 15 G/DL (ref 13.5–17.5)
KETONES UR-MCNC: 20 MG/DL
LEUKOCYTE ESTERASE UR QL STRIP.AUTO: NEGATIVE
LYMPHOCYTES # BLD: 1 K/UL (ref 1–4)
LYMPHOCYTES NFR BLD: 6 %
MCH RBC QN AUTO: 30 PG (ref 27–32)
MCHC RBC AUTO-ENTMCNC: 34.2 G/DL (ref 32–37)
MCV RBC AUTO: 87.9 FL (ref 80–100)
MONOCYTES # BLD: 1.2 K/UL (ref 0–1)
MONOCYTES NFR BLD: 7 %
NEUTROPHILS # BLD AUTO: 16.3 K/UL (ref 1.8–7.7)
NEUTROPHILS NFR BLD: 88 %
NITRITE UR QL STRIP.AUTO: NEGATIVE
OSMOLALITY UR CALC.SUM OF ELEC: 292 MOSM/KG (ref 275–295)
PH UR: 7 [PH] (ref 5–8)
PLATELET # BLD AUTO: 283 K/UL (ref 140–400)
PMV BLD AUTO: 8.9 FL (ref 7.4–10.3)
POTASSIUM SERPL-SCNC: 3.6 MMOL/L (ref 3.3–5.1)
PROT UR-MCNC: 100 MG/DL
RBC # BLD AUTO: 5 M/UL (ref 4.5–5.9)
RBC #/AREA URNS AUTO: 450 /HPF
SODIUM SERPL-SCNC: 138 MMOL/L (ref 136–144)
SP GR UR STRIP: 1.02 (ref 1–1.03)
UROBILINOGEN UR STRIP-ACNC: <2
WBC # BLD AUTO: 18.7 K/UL (ref 4–11)
WBC #/AREA URNS AUTO: 2 /HPF

## 2018-07-11 PROCEDURE — 96375 TX/PRO/DX INJ NEW DRUG ADDON: CPT

## 2018-07-11 PROCEDURE — 80048 BASIC METABOLIC PNL TOTAL CA: CPT | Performed by: EMERGENCY MEDICINE

## 2018-07-11 PROCEDURE — 81001 URINALYSIS AUTO W/SCOPE: CPT | Performed by: EMERGENCY MEDICINE

## 2018-07-11 PROCEDURE — 99284 EMERGENCY DEPT VISIT MOD MDM: CPT

## 2018-07-11 PROCEDURE — 85025 COMPLETE CBC W/AUTO DIFF WBC: CPT | Performed by: EMERGENCY MEDICINE

## 2018-07-11 PROCEDURE — 96374 THER/PROPH/DIAG INJ IV PUSH: CPT

## 2018-07-11 PROCEDURE — 96376 TX/PRO/DX INJ SAME DRUG ADON: CPT

## 2018-07-11 PROCEDURE — 74176 CT ABD & PELVIS W/O CONTRAST: CPT | Performed by: EMERGENCY MEDICINE

## 2018-07-11 RX ORDER — TAMSULOSIN HYDROCHLORIDE 0.4 MG/1
0.4 CAPSULE ORAL DAILY
Qty: 7 CAPSULE | Refills: 0 | Status: SHIPPED | OUTPATIENT
Start: 2018-07-11 | End: 2018-07-18

## 2018-07-11 RX ORDER — CEFADROXIL 500 MG/1
500 CAPSULE ORAL 2 TIMES DAILY
Qty: 10 CAPSULE | Refills: 0 | Status: SHIPPED | OUTPATIENT
Start: 2018-07-11 | End: 2018-07-16

## 2018-07-11 RX ORDER — HYDROCODONE BITARTRATE AND ACETAMINOPHEN 5; 325 MG/1; MG/1
1 TABLET ORAL EVERY 8 HOURS PRN
Qty: 25 TABLET | Refills: 0 | Status: SHIPPED | OUTPATIENT
Start: 2018-07-11 | End: 2018-07-18

## 2018-07-11 RX ORDER — ONDANSETRON 2 MG/ML
4 INJECTION INTRAMUSCULAR; INTRAVENOUS ONCE
Status: COMPLETED | OUTPATIENT
Start: 2018-07-11 | End: 2018-07-11

## 2018-07-11 RX ORDER — KETOROLAC TROMETHAMINE 30 MG/ML
30 INJECTION, SOLUTION INTRAMUSCULAR; INTRAVENOUS ONCE
Status: COMPLETED | OUTPATIENT
Start: 2018-07-11 | End: 2018-07-11

## 2018-07-11 RX ORDER — MORPHINE SULFATE 4 MG/ML
4 INJECTION, SOLUTION INTRAMUSCULAR; INTRAVENOUS ONCE
Status: COMPLETED | OUTPATIENT
Start: 2018-07-11 | End: 2018-07-11

## 2018-07-11 NOTE — TELEPHONE ENCOUNTER
Patient is to see me in 2 weeks with a KUB and right lateral oblique 1-2 days prior to the visit. I placed an order in the computer for it.

## 2018-07-11 NOTE — ED PROVIDER NOTES
Patient Seen in: [facility] AND [facility] Emergency Department    History   Patient presents with:  Pain (neurologic)    Stated Complaint:     HPI    The patient is a 44-year-old male with past history of kidney stones who presents now with right flank pain.   Ortiz Cartwright tenderness  Eyes: Nonicteric sclera, no conjunctival injection  ENT: TMs are clear and flat bilaterally.   There is no posterior pharyngeal erythema  Chest: Clear to auscultation, no tenderness  Cardiovascular: Regular rate and rhythm without murmur  Abdome discussed with patient's primary urologist at 1:45 PM.  He recommends a kidney stone protocol CT scan to rule out subcapsular hematoma. If negative, the patient may be given IV Toradol.     The patient had some persistent pain until given IV Toradol from w

## 2018-07-12 NOTE — TELEPHONE ENCOUNTER
I spoke with pt and informed him of DOUG's msg and instructions in his msg below and I told pt the order for DARON is in the system and he does not need an appt.  I also gave pt an appt for the post op visit for Fri. 7/27 at 10 AM.

## 2018-07-13 ENCOUNTER — TELEPHONE (OUTPATIENT)
Dept: SURGERY | Facility: CLINIC | Age: 48
End: 2018-07-13

## 2018-07-13 NOTE — TELEPHONE ENCOUNTER
PVK returned written instructions to have pt go to the ER for tx since pain is severe. Better for him to go sooner rather than tonight. I called pt back to inform him of this and he verbalized understanding and will comply.

## 2018-07-13 NOTE — TELEPHONE ENCOUNTER
RACHEL came to me to inform that he was just paged by this pt and asked that I call him back to see what the issue is. He stated that there was something about a procedure on wed and about abd pain.      I called the pt and determined that he had lithotripsy o

## 2018-07-18 ENCOUNTER — TELEPHONE (OUTPATIENT)
Dept: SURGERY | Facility: CLINIC | Age: 48
End: 2018-07-18

## 2018-07-18 NOTE — TELEPHONE ENCOUNTER
Spoke with DOUG pt and determined that e is still having moderate to severe Rt flank pain that is sharp and radiates from the flank around the body to the Rt. Groin area.  States that he has been taking Norco pretty much every 4-5 hrs and this only alleviate

## 2018-07-18 NOTE — TELEPHONE ENCOUNTER
I tried calling the emerg # listed and I was able to reach the pt and I informed him of BALJEETK's msg below and he verbalized understanding and will comply.

## 2018-07-18 NOTE — TELEPHONE ENCOUNTER
Because of the extent of patient's problems and their severity, please ask  patient to go to the emergency room to be evaluated , because workup can be performed much faster there, and any necessary testing can be performed immediately. .  Thanks, Dr. Dee Falcon

## 2018-07-18 NOTE — TELEPHONE ENCOUNTER
Patient states he is still is a lot of pain and vomiting. Patient states has passed one kidney. Is looking to see if pain medication can be prescribed. Also has not had bowel movement since procedure done 07/11/18. -- call transferred.

## 2018-07-18 NOTE — TELEPHONE ENCOUNTER
Phoned pt three times, however call never connects,wth no ringing or . Home number listed is same and cell.

## 2018-07-26 ENCOUNTER — HOSPITAL ENCOUNTER (OUTPATIENT)
Dept: GENERAL RADIOLOGY | Facility: HOSPITAL | Age: 48
Discharge: HOME | End: 2018-07-26
Attending: UROLOGY
Payer: COMMERCIAL

## 2018-07-26 DIAGNOSIS — N20.0 KIDNEY STONE: ICD-10-CM

## 2018-07-26 PROCEDURE — 74018 RADEX ABDOMEN 1 VIEW: CPT | Performed by: UROLOGY

## 2018-07-27 ENCOUNTER — OFFICE VISIT (OUTPATIENT)
Dept: SURGERY | Facility: CLINIC | Age: 48
End: 2018-07-27
Payer: COMMERCIAL

## 2018-07-27 VITALS
BODY MASS INDEX: 23 KG/M2 | TEMPERATURE: 98 F | SYSTOLIC BLOOD PRESSURE: 111 MMHG | HEART RATE: 73 BPM | WEIGHT: 160 LBS | DIASTOLIC BLOOD PRESSURE: 73 MMHG

## 2018-07-27 DIAGNOSIS — N20.0 KIDNEY STONE: Primary | ICD-10-CM

## 2018-07-27 PROCEDURE — 99212 OFFICE O/P EST SF 10 MIN: CPT | Performed by: UROLOGY

## 2018-07-27 PROCEDURE — 99024 POSTOP FOLLOW-UP VISIT: CPT | Performed by: UROLOGY

## 2018-07-27 NOTE — PROGRESS NOTES
Katheran Schirmer is a 57year old male. HPI:   Patient presents with: Follow - Up: patient presents for f/u on kidney stones      62-year-old male presents in follow-up to [facility] for a 7 mm right proximal obstructing ureteral stone  11/2018.   He has mult Rfl: 0   Tri-Mix Standard (PPP) Injection Solution 0.2 mL by Intracavernosal route as needed. Inject intracavernosally as directed prior to intercourse Tri-Mix Standard Recipe:- Prostaglandin E1 5.88 ug/ml- Papaverine HCI 18mg/ ml- Phentolamine Mesylate 0.

## 2018-09-08 RX ORDER — ZOLPIDEM TARTRATE 5 MG/1
TABLET ORAL
Qty: 30 TABLET | Refills: 2 | OUTPATIENT
Start: 2018-09-08 | End: 2018-12-12

## 2018-09-08 NOTE — TELEPHONE ENCOUNTER
No Protocol on this med. Pending Prescriptions Disp Refills    ZOLPIDEM TARTRATE 5 MG Oral Tab [Pharmacy Med Name: ZOLPIDEM 5MG TABLETS] 30 tablet 0     Sig: TAKE 1 TABLET BY MOUTH NIGHTLY AS NEEDED FOR SLEEP           LOV 7-5-18  LR 2-17-18 # 30 + 5  Unable to refill per protocol. pls advise, thanks.

## 2018-09-21 ENCOUNTER — APPOINTMENT (OUTPATIENT)
Dept: LAB | Facility: HOSPITAL | Age: 48
End: 2018-09-21
Attending: INTERNAL MEDICINE
Payer: COMMERCIAL

## 2018-09-21 DIAGNOSIS — R79.89 ELEVATED LIVER FUNCTION TESTS: ICD-10-CM

## 2018-09-21 LAB
ALBUMIN SERPL BCP-MCNC: 4.1 G/DL (ref 3.5–4.8)
ALP SERPL-CCNC: 64 U/L (ref 32–100)
ALT SERPL-CCNC: 20 U/L (ref 17–63)
AST SERPL-CCNC: 19 U/L (ref 15–41)
BILIRUB DIRECT SERPL-MCNC: 0.1 MG/DL (ref 0–0.2)
BILIRUB SERPL-MCNC: 0.9 MG/DL (ref 0.3–1.2)
HBV CORE AB SERPL QL IA: NONREACTIVE
HBV SURFACE AB SER-ACNC: 3.12 MIU/ML (ref ?–10)
HBV SURFACE AG SERPL QL IA: NONREACTIVE
HBV SURFACE AG SERPL QL IA: NONREACTIVE
HCV AB SERPL QL IA: NONREACTIVE
PROT SERPL-MCNC: 7.9 G/DL (ref 5.9–8.4)

## 2018-09-21 PROCEDURE — 86803 HEPATITIS C AB TEST: CPT

## 2018-09-21 PROCEDURE — 87340 HEPATITIS B SURFACE AG IA: CPT

## 2018-09-21 PROCEDURE — 80076 HEPATIC FUNCTION PANEL: CPT

## 2018-09-21 PROCEDURE — 80500 HEPATITIS B PROFILE: CPT

## 2018-09-21 PROCEDURE — 36415 COLL VENOUS BLD VENIPUNCTURE: CPT

## 2018-09-21 PROCEDURE — 86704 HEP B CORE ANTIBODY TOTAL: CPT

## 2018-09-21 PROCEDURE — 86706 HEP B SURFACE ANTIBODY: CPT

## 2018-10-04 ENCOUNTER — OFFICE VISIT (OUTPATIENT)
Dept: INTERNAL MEDICINE CLINIC | Facility: CLINIC | Age: 48
End: 2018-10-04
Payer: COMMERCIAL

## 2018-10-04 VITALS
HEART RATE: 83 BPM | BODY MASS INDEX: 23.28 KG/M2 | HEIGHT: 69.5 IN | WEIGHT: 160.81 LBS | DIASTOLIC BLOOD PRESSURE: 69 MMHG | SYSTOLIC BLOOD PRESSURE: 101 MMHG

## 2018-10-04 DIAGNOSIS — M94.0 COSTOCHONDRITIS, ACUTE: ICD-10-CM

## 2018-10-04 DIAGNOSIS — K92.1 HEMATOCHEZIA: Primary | ICD-10-CM

## 2018-10-04 PROBLEM — R79.89 ELEVATED LIVER FUNCTION TESTS: Status: RESOLVED | Noted: 2018-07-05 | Resolved: 2018-10-04

## 2018-10-04 PROCEDURE — 99212 OFFICE O/P EST SF 10 MIN: CPT | Performed by: INTERNAL MEDICINE

## 2018-10-04 PROCEDURE — 99214 OFFICE O/P EST MOD 30 MIN: CPT | Performed by: INTERNAL MEDICINE

## 2018-10-04 NOTE — PATIENT INSTRUCTIONS
Costochondritis    Costochondritis is inflammation of a rib or the cartilage that connects a rib to your breastbone (sternum). It causes tenderness, and sometimes chest pain may be sharp or aching, or it may feel like pressure.  Pain may get worse with de Call the healthcare provider right away if you have any of the following:  · Pain that is not relieved by medicine  · Shortness of breath  · Lightheadedness, dizziness, or fainting  · Feeling of irregular heartbeat or fast pulse  Anyone with chest pain krista

## 2018-10-04 NOTE — PROGRESS NOTES
HPI:    Patient ID: Garrett Marcano is a 57year old male. Pt had blood in the stool for the past 3 days. It seems like a lot of blood. It happened 5 times or so. He had in the past, but only a small amount. He wasn't constipated or diarrhea.   He a MG Oral Tab Take 1 tablet (50 mg total) by mouth 2 (two) times daily as needed for Pain.  Disp: 60 tablet Rfl: 3   ZOLPIDEM TARTRATE 5 MG Oral Tab TAKE 1 TABLET BY MOUTH NIGHTLY AS NEEDED FOR SLEEP Disp: 30 tablet Rfl: 2   hydrocortisone (ANUSOL-HC) 2.5 % R pt  F/u if recurs. The patient expressed understanding and agreed with the plan.     Meds This Visit:  Requested Prescriptions      No prescriptions requested or ordered in this encounter

## 2018-12-05 NOTE — H&P
[address] Gastroenterology                                                                                                  Clinic History and Physical     No NIGHTLY AS NEEDED FOR SLEEP Disp: 30 tablet Rfl: 2   HYDROcodone-acetaminophen 5-325 MG Oral Tab Take 2 tablets by mouth every 6 (six) hours as needed.  Disp: 20 tablet Rfl: 0   Tri-Mix Standard (PPP) Injection Solution 0.2 mL by Intracavernosal route as ne and rhythm, the extremities are warm and well perfused   Lung: moves air well; no labored breathing  Abdomen: soft, NTND abdomen with +NABS appreciated, no hepatosplenomegaly appreciated   Back: No CVA tenderness  Skin: dry, warm, no jaundice  Ext: no LE e questions were answered to the patient’s satisfaction. The patient signed informed consent and elected to proceed with colonoscopy with intervention [i.e. polypectomy, stent placement, etc.] as indicated.       Orders This Visit:  No orders of the defined t

## 2018-12-06 ENCOUNTER — OFFICE VISIT (OUTPATIENT)
Dept: GASTROENTEROLOGY | Facility: CLINIC | Age: 48
End: 2018-12-06
Payer: COMMERCIAL

## 2018-12-06 ENCOUNTER — TELEPHONE (OUTPATIENT)
Dept: GASTROENTEROLOGY | Facility: CLINIC | Age: 48
End: 2018-12-06

## 2018-12-06 VITALS
HEIGHT: 69.5 IN | WEIGHT: 159.38 LBS | DIASTOLIC BLOOD PRESSURE: 87 MMHG | BODY MASS INDEX: 23.07 KG/M2 | HEART RATE: 78 BPM | SYSTOLIC BLOOD PRESSURE: 125 MMHG

## 2018-12-06 DIAGNOSIS — K64.9 HEMORRHOIDS, UNSPECIFIED HEMORRHOID TYPE: ICD-10-CM

## 2018-12-06 DIAGNOSIS — K62.5 RECTAL BLEEDING: ICD-10-CM

## 2018-12-06 DIAGNOSIS — R12 HEARTBURN: Primary | ICD-10-CM

## 2018-12-06 DIAGNOSIS — K59.00 CONSTIPATION, UNSPECIFIED CONSTIPATION TYPE: ICD-10-CM

## 2018-12-06 DIAGNOSIS — K62.89 RECTAL PAIN: ICD-10-CM

## 2018-12-06 PROCEDURE — 99244 OFF/OP CNSLTJ NEW/EST MOD 40: CPT | Performed by: PHYSICIAN ASSISTANT

## 2018-12-06 PROCEDURE — 99212 OFFICE O/P EST SF 10 MIN: CPT | Performed by: PHYSICIAN ASSISTANT

## 2018-12-06 RX ORDER — POLYETHYLENE GLYCOL 3350, SODIUM CHLORIDE, SODIUM BICARBONATE, POTASSIUM CHLORIDE 420; 11.2; 5.72; 1.48 G/4L; G/4L; G/4L; G/4L
POWDER, FOR SOLUTION ORAL
Qty: 1 BOTTLE | Refills: 0 | Status: SHIPPED | OUTPATIENT
Start: 2018-12-06 | End: 2021-06-29

## 2018-12-06 NOTE — TELEPHONE ENCOUNTER
Scheduled for:  Colonoscopy - [phone] & EGD - [phone]  Provider Name:  Dr. Slime Eric  Date:  12/12/18  Location:  [facility]  Sedation:  MAC  Time:  (pt is aware that [facility] will call with arrival time)  Prep:  Trilyte, Prep instructions were given to pt in the office,

## 2018-12-06 NOTE — H&P
[address] [facility] [address] Gastroenterology                                                                                                  Clinic History and Physical     Pa gastric cancer  - No family hx of CRC   - No family history of IBD.     Prior endoscopies:  - None     Social Hx:  - No smoking/Social ETOH  - Denies illicit drug use   - Occupation: automotive    - Lives with fiance     History, Medications, Aller needed for Pain. Disp: 60 tablet Rfl: 3   ZOLPIDEM TARTRATE 5 MG Oral Tab TAKE 1 TABLET BY MOUTH NIGHTLY AS NEEDED FOR SLEEP Disp: 30 tablet Rfl: 2   hydrocortisone (ANUSOL-HC) 2.5 % Rectal Cream Place 1 Application rectally 2 (two) times daily.  Disp: 1 Tu evaluation of rectal bleeding, rectal pain, hemorrhoids and GERD. No prior colonoscopy. No anemia noted on most recent labs.     # Rectal Bleeding/Rectal Pain: Differential to include hemorrhoidal, fissure, diverticular, AVM, polyp, IBD or malignancy - low circumstances. The patient has agreed to sign an informed consent and elected to proceed with the procedures with possible intervention [i.e. polypectomy, stent placement, etc.] as indicated.               Orders This Visit:  No orders of the defined types

## 2018-12-06 NOTE — PATIENT INSTRUCTIONS
1. Schedule colonoscopy and upper endoscopy with MAC sedation @ [facility] with Dr. Josi Salinas or Dr. Fernandez Cruz on a Thursday or Friday if possible. 2.  bowel prep from pharmacy - I have prescribed Trilyte split dose preparation.     3. If MAC @ [facility]:

## 2018-12-12 ENCOUNTER — LAB REQUISITION (OUTPATIENT)
Dept: LAB | Facility: HOSPITAL | Age: 48
End: 2018-12-12
Payer: COMMERCIAL

## 2018-12-12 DIAGNOSIS — Z01.89 ENCOUNTER FOR OTHER SPECIFIED SPECIAL EXAMINATIONS: ICD-10-CM

## 2018-12-12 PROCEDURE — 88305 TISSUE EXAM BY PATHOLOGIST: CPT | Performed by: INTERNAL MEDICINE

## 2018-12-12 PROCEDURE — 88312 SPECIAL STAINS GROUP 1: CPT | Performed by: INTERNAL MEDICINE

## 2018-12-12 RX ORDER — ZOLPIDEM TARTRATE 5 MG/1
TABLET ORAL
Qty: 30 TABLET | Refills: 1 | Status: SHIPPED
Start: 2018-12-12 | End: 2018-12-21

## 2018-12-13 ENCOUNTER — TELEPHONE (OUTPATIENT)
Dept: GASTROENTEROLOGY | Facility: CLINIC | Age: 48
End: 2018-12-13

## 2018-12-13 NOTE — TELEPHONE ENCOUNTER
Requested Prescriptions     Pending Prescriptions Disp Refills   • ZOLPIDEM TARTRATE 5 MG Oral Tab [Pharmacy Med Name: ZOLPIDEM 5MG TABLETS] 30 tablet 0     Sig: TAKE 1 TABLET BY MOUTH AT BEDTIME AS NEEDED FOR SLEEP       Last Office Visit with PCP: 10/4/2

## 2018-12-14 NOTE — TELEPHONE ENCOUNTER
Beverly Li MD  [facility] Clinical Staff             GI staff: please place recall for colonoscopy in 5 years      Results letter mailed out to pt today. Colon recall entered for 5 years. Snapshot updated.

## 2018-12-20 DIAGNOSIS — F51.01 PRIMARY INSOMNIA: ICD-10-CM

## 2018-12-20 NOTE — TELEPHONE ENCOUNTER
Pt called in requesting to have his recent refill for Zolpidem sent to his [address] in [place], [place].   Please advise       Current Outpatient Medications:   •  ZOLPIDEM TARTRATE 5 MG Oral Tab, TAKE 1 TABLET BY MOUTH AT BEDTIME AS NEEDED FOR SLEEP

## 2018-12-21 RX ORDER — ZOLPIDEM TARTRATE 5 MG/1
TABLET ORAL
Qty: 30 TABLET | Refills: 1 | OUTPATIENT
Start: 2018-12-21

## 2018-12-21 RX ORDER — ZOLPIDEM TARTRATE 5 MG/1
TABLET ORAL
Qty: 30 TABLET | Refills: 1 | Status: SHIPPED
Start: 2018-12-21 | End: 2018-12-27

## 2018-12-21 NOTE — TELEPHONE ENCOUNTER
Spoke with patient, advised to call Lindsey Pedro and tell them to call [facility] to transfer the prescription, it was faxed on 12/12/18. Patient verbalized understanding.

## 2018-12-21 NOTE — TELEPHONE ENCOUNTER
Gary Gonzalez and spoke with Nito Rod, pharmacist , cancelled prescription and will send a new  One to [facility].     Medication pended for approval, NO PROTOCOL, was initially approved and sent to [facility] and now wants to use [facility]

## 2018-12-21 NOTE — TELEPHONE ENCOUNTER
Pt was calling back stating he called pharmacy [facility] to transfer the prescription to the new pharmacy [address], however, [facility] [place] states this need to be sent over directly from  with a new prescription number.  Since t

## 2018-12-24 DIAGNOSIS — F51.01 PRIMARY INSOMNIA: ICD-10-CM

## 2018-12-24 RX ORDER — ZOLPIDEM TARTRATE 12.5 MG/1
TABLET, FILM COATED, EXTENDED RELEASE ORAL
Qty: 30 TABLET | Refills: 0 | OUTPATIENT
Start: 2018-12-24

## 2018-12-26 NOTE — TELEPHONE ENCOUNTER
Please see note below. Patient has picked up the 5mg tablet prescription, but intended to ask for the 12.5 mg prescription. He is now asking for the 12.5 mg prescription to be phoned into the [facility] in [place] today. Please advise.

## 2018-12-26 NOTE — TELEPHONE ENCOUNTER
Pt called in stating that he requested the wrong doasage for Zolpidem. Pt stated that he usually takes 12.5 mg of Ambien CR. Pt stated that the pharmacy will not take the medication back but he does not want the 5 mg dosage.   Please advise

## 2018-12-27 RX ORDER — ZOLPIDEM TARTRATE 12.5 MG/1
12.5 TABLET, FILM COATED, EXTENDED RELEASE ORAL NIGHTLY PRN
Qty: 30 TABLET | Refills: 1 | OUTPATIENT
Start: 2018-12-27 | End: 2019-02-07

## 2018-12-27 NOTE — TELEPHONE ENCOUNTER
Called pharmacy and called medication into pharmacy with pharmacist Renee Ludwig to notify pt when ready to be picked up

## 2019-01-03 NOTE — TELEPHONE ENCOUNTER
Patient calling stated we sent the incorrect dosage. Zolpidem 5 mg was sent to the pharmacy and he picked it up. His insurance will not cover the Zolpidem 12.5 mg because he picked up the 5 mg and he cannot return the medication.     The patient feels t

## 2019-01-07 NOTE — TELEPHONE ENCOUNTER
Jacob / message, Pt stated he has only 2 pills left  And he did not make the error so why should he have to pay out of pocket   Spoke to Supervisor, will handle this issue

## 2019-01-07 NOTE — TELEPHONE ENCOUNTER
LMTCB  Transfer to triage    Per the pharmacy the Zolpidem 12.5 mg can be picked up on 1/20/19 or he can pay out of pocket or them.

## 2019-01-07 NOTE — TELEPHONE ENCOUNTER
Per CSS, patient is following up regarding the Medication Zolpidem, advised that will send this message to nursing supervisor Suzanne Rather to take a look at it. Message sent via [facility] to [facility].

## 2019-01-07 NOTE — TELEPHONE ENCOUNTER
Patient states that he has tried to return the 5mg pills but the pharmacy would not take them back;   He has not picked up the 12/5 mg pills yet, as he has been informed that the insurance may not cover the cost.    Patient has tried using the 5mg tablets f

## 2019-01-08 NOTE — TELEPHONE ENCOUNTER
Patient advised that this matter is being looked into by the patient experience team.  He will be notified of progress tomroow.

## 2019-01-11 ENCOUNTER — TELEPHONE (OUTPATIENT)
Dept: INTERNAL MEDICINE CLINIC | Facility: CLINIC | Age: 49
End: 2019-01-11

## 2019-01-11 ENCOUNTER — OFFICE VISIT (OUTPATIENT)
Dept: GASTROENTEROLOGY | Facility: CLINIC | Age: 49
End: 2019-01-11
Payer: COMMERCIAL

## 2019-01-11 VITALS
HEIGHT: 69.5 IN | SYSTOLIC BLOOD PRESSURE: 117 MMHG | HEART RATE: 71 BPM | WEIGHT: 153.38 LBS | BODY MASS INDEX: 22.21 KG/M2 | DIASTOLIC BLOOD PRESSURE: 74 MMHG

## 2019-01-11 DIAGNOSIS — Z09 FOLLOW UP: ICD-10-CM

## 2019-01-11 DIAGNOSIS — K21.9 GASTROESOPHAGEAL REFLUX DISEASE, ESOPHAGITIS PRESENCE NOT SPECIFIED: ICD-10-CM

## 2019-01-11 DIAGNOSIS — K64.9 HEMORRHOIDS, UNSPECIFIED HEMORRHOID TYPE: ICD-10-CM

## 2019-01-11 DIAGNOSIS — K62.5 RECTAL BLEEDING: Primary | ICD-10-CM

## 2019-01-11 PROCEDURE — 99214 OFFICE O/P EST MOD 30 MIN: CPT | Performed by: PHYSICIAN ASSISTANT

## 2019-01-11 PROCEDURE — 99212 OFFICE O/P EST SF 10 MIN: CPT | Performed by: PHYSICIAN ASSISTANT

## 2019-01-11 NOTE — PROGRESS NOTES
[address] [facility] [address] Gastroenterology                                                                                                  Clinic History and Physical     Pa Patient gives history of epigastric pain and hematemesis - was diagnosed with gastric ulcers during his teens. Did not have an upper endoscopy at that time.  Endorses that he no longer has epigastric pain nor n/v/hematemesis, but has been taking Omepraz 1   Lisinopril-Hydrochlorothiazide 20-12.5 MG Oral Tab 1 daily.  Disp: 90 tablet Rfl: 1   omeprazole 20 MG Oral Capsule Delayed Release TAKE 1 CAPSULE BY MOUTH EVERY MORNING Disp: 90 capsule Rfl: 1   TraMADol HCl 50 MG Oral Tab Take 1 tablet (50 mg total) b regular rate and rhythm  Lung: moves air well; no labored breathing  Abdomen: soft, NTND abdomen with +NABS appreciated  Skin: dry, warm, no jaundice  Ext: no LE edema is evident.    Neuro: Alert and interactive, and patient is having movements of all 4 ext Ron Santana for hemorrhoid management  - Start fiber   - See above    Orders This Visit:  No orders of the defined types were placed in this encounter.       Meds This Visit:  Requested Prescriptions      No prescriptions requested or ordered in this encounter

## 2019-01-11 NOTE — PATIENT INSTRUCTIONS
1. Referral Dr. Janina Sanchez for hemorrhoid management. Please call my office if you have any issues getting in to see him.     2. Start Benefiber each day - you can also buy the generic from [address] [place] [address] or [facility].    3. Continue Prilosec 20 mg each day 30 mi

## 2019-01-11 NOTE — TELEPHONE ENCOUNTER
Patient came in stating that he was going to be reimburse for Zolpidem because we send in the wrong dosage.  I saw the communication from 12/20 that the patient experience team was going to look into it and be notified but patient states he hasn't received

## 2019-01-18 ENCOUNTER — OFFICE VISIT (OUTPATIENT)
Dept: SURGERY | Facility: CLINIC | Age: 49
End: 2019-01-18
Payer: COMMERCIAL

## 2019-01-18 VITALS — HEIGHT: 69.5 IN | WEIGHT: 155 LBS | BODY MASS INDEX: 22.44 KG/M2

## 2019-01-18 DIAGNOSIS — K60.2 ANAL FISSURE: Primary | ICD-10-CM

## 2019-01-18 DIAGNOSIS — K64.8 INTERNAL HEMORRHOIDS WITH COMPLICATION: ICD-10-CM

## 2019-01-18 PROCEDURE — 99212 OFFICE O/P EST SF 10 MIN: CPT | Performed by: SURGERY

## 2019-01-18 PROCEDURE — 99214 OFFICE O/P EST MOD 30 MIN: CPT | Performed by: SURGERY

## 2019-01-18 NOTE — PROGRESS NOTES
History and Physical      Shannen Mackay is a 57year old male. HPI   Patient presents with:  Hemorrhoids: Patient referred by GI Dr. Leni Lorenzana for hemorrhoids.  Patient states he has experienced bleeding and pain intermittently with BMs for past few Recipe:- Prostaglandin E1 5.88 ug/ml- Papaverine HCI 18mg/ ml- Phentolamine Mesylate 0.6 mg/ ml Disp: 5 mL Rfl: 6   Lisinopril-Hydrochlorothiazide 20-12.5 MG Oral Tab 1 daily.  Disp: 90 tablet Rfl: 1   omeprazole 20 MG Oral Capsule Delayed Release TAKE 1 CA bilaterally normal respiratory effort  Cardiovascular: regular rate and rhythm no murmurs, gallups, or rubs  Abdomen: soft non-tender non-distended no organomegaly noted no masses  Extremities: no edema, cyanosis, or clubbing  Neurological: exam appropriat

## 2019-02-07 PROBLEM — M67.431 GANGLION CYST OF WRIST, RIGHT: Status: ACTIVE | Noted: 2019-02-07

## 2019-02-07 NOTE — PROGRESS NOTES
HPI:    Patient ID: Sebastien Geronimo is a 57year old male. Pt c/o recurrent back pain for the past 2 weeks. He had back surgery in the past.  He would like tramadol. C/o painful lump on his right wrist.  He needs the Ambien every day to sleep.       L Prostaglandin E1 5.88 ug/ml- Papaverine HCI 18mg/ ml- Phentolamine Mesylate 0.6 mg/ ml Disp: 5 mL Rfl: 6   Lisinopril-Hydrochlorothiazide 20-12.5 MG Oral Tab 1 daily.  Disp: 90 tablet Rfl: 1   omeprazole 20 MG Oral Capsule Delayed Release TAKE 1 CAPSULE BY - Primary     Controlled. Continue present management. Relevant Medications    Zolpidem Tartrate ER (AMBIEN CR) 12.5 MG Oral Tab CR       Unprioritized    Essential hypertension     Controlled. Continue present management.          Ganglion cyst o

## 2019-03-22 DIAGNOSIS — I10 ESSENTIAL HYPERTENSION: ICD-10-CM

## 2019-03-22 RX ORDER — LISINOPRIL AND HYDROCHLOROTHIAZIDE 20; 12.5 MG/1; MG/1
TABLET ORAL
Qty: 90 TABLET | Refills: 1 | Status: SHIPPED | OUTPATIENT
Start: 2019-03-22 | End: 2019-09-10

## 2019-03-25 ENCOUNTER — TELEPHONE (OUTPATIENT)
Dept: INTERNAL MEDICINE CLINIC | Facility: CLINIC | Age: 49
End: 2019-03-25

## 2019-03-25 NOTE — TELEPHONE ENCOUNTER
Pt called in stating that he needs a refill on medication below. He only has 1 pill left. Pharmacy on chart confirmed. Please advise.        Current Outpatient Medications:   •  Zolpidem Tartrate ER (AMBIEN CR) 12.5 MG Oral Tab CR, Take 1 tablet (12.5 m

## 2019-03-25 NOTE — TELEPHONE ENCOUNTER
Spoke with pharmacist Tanya and patient rx for Zolpidem is at the [facility] in [place]. Contacted that [facility] spoke with pharmacist Ayesha Blank and advised patient has refills on file and to please refill rx for patient.  Pharmacy will notify patient when

## 2019-04-24 DIAGNOSIS — F51.01 PRIMARY INSOMNIA: ICD-10-CM

## 2019-04-25 RX ORDER — ZOLPIDEM TARTRATE 12.5 MG/1
12.5 TABLET, FILM COATED, EXTENDED RELEASE ORAL NIGHTLY PRN
Qty: 30 TABLET | Refills: 2 | Status: SHIPPED
Start: 2019-04-25 | End: 2019-07-24

## 2019-07-12 DIAGNOSIS — K21.9 GASTROESOPHAGEAL REFLUX DISEASE WITHOUT ESOPHAGITIS: ICD-10-CM

## 2019-07-12 RX ORDER — OMEPRAZOLE 20 MG/1
CAPSULE, DELAYED RELEASE ORAL
Qty: 90 CAPSULE | Refills: 1 | Status: SHIPPED | OUTPATIENT
Start: 2019-07-12 | End: 2020-01-06

## 2019-07-12 NOTE — TELEPHONE ENCOUNTER
Current Outpatient Medications:  omeprazole 20 MG Oral Capsule Delayed Release TAKE 1 CAPSULE BY MOUTH EVERY MORNING Disp: 90 capsule Rfl: 1

## 2019-07-12 NOTE — TELEPHONE ENCOUNTER
Refill passed per [facility], [facility] protocol.   Refill Protocol Appointment Criteria  · Appointment scheduled in the past 12 months or in the next 3 months  Recent Outpatient Visits            5 months ago Primary insomnia    [facility], [facility], [address], [place]

## 2019-07-24 DIAGNOSIS — F51.01 PRIMARY INSOMNIA: ICD-10-CM

## 2019-07-24 RX ORDER — ZOLPIDEM TARTRATE 12.5 MG/1
TABLET, FILM COATED, EXTENDED RELEASE ORAL
Qty: 30 TABLET | Refills: 0 | OUTPATIENT
Start: 2019-07-24 | End: 2019-09-03

## 2019-07-24 NOTE — TELEPHONE ENCOUNTER
Controlled medication pending for review. If approved needs to be called in or faxed by on-site staff.     Last Rx: 04/25/19  LOV: 02/27/19

## 2019-07-24 NOTE — TELEPHONE ENCOUNTER
Rx for Zolpidem Tartrate ER 12.5 mg oral tabl CR called in to [address] #[id number] voicemail as approved by Dr. Juju Harper covering physician for Dr. Misa Balderrama.

## 2019-09-03 DIAGNOSIS — F51.01 PRIMARY INSOMNIA: ICD-10-CM

## 2019-09-04 RX ORDER — ZOLPIDEM TARTRATE 12.5 MG/1
TABLET, FILM COATED, EXTENDED RELEASE ORAL
Qty: 30 TABLET | Refills: 1 | Status: SHIPPED | OUTPATIENT
Start: 2019-09-04 | End: 2019-09-10

## 2019-09-04 RX ORDER — ZOLPIDEM TARTRATE 12.5 MG/1
TABLET, FILM COATED, EXTENDED RELEASE ORAL
Qty: 30 TABLET | Refills: 1 | OUTPATIENT
Start: 2019-09-04 | End: 2019-09-04

## 2019-09-04 NOTE — TELEPHONE ENCOUNTER
Controlled medications pending for review. If approved needs to be called in or faxed by on site staff.     Last Rx: 7-24-19 # 30  LOV: 2-7-19    Requested Prescriptions     Pending Prescriptions Disp Refills   • ZOLPIDEM TARTRATE ER 12.5 MG Oral Tab CR [Ph

## 2019-09-10 ENCOUNTER — OFFICE VISIT (OUTPATIENT)
Dept: INTERNAL MEDICINE CLINIC | Facility: CLINIC | Age: 49
End: 2019-09-10
Payer: COMMERCIAL

## 2019-09-10 ENCOUNTER — LAB ENCOUNTER (OUTPATIENT)
Dept: LAB | Facility: HOSPITAL | Age: 49
End: 2019-09-10
Attending: INTERNAL MEDICINE
Payer: COMMERCIAL

## 2019-09-10 VITALS
SYSTOLIC BLOOD PRESSURE: 113 MMHG | BODY MASS INDEX: 23.15 KG/M2 | HEIGHT: 69.5 IN | WEIGHT: 159.88 LBS | HEART RATE: 89 BPM | DIASTOLIC BLOOD PRESSURE: 79 MMHG

## 2019-09-10 DIAGNOSIS — F51.01 PRIMARY INSOMNIA: ICD-10-CM

## 2019-09-10 DIAGNOSIS — Z00.00 ROUTINE HEALTH MAINTENANCE: ICD-10-CM

## 2019-09-10 DIAGNOSIS — I10 ESSENTIAL HYPERTENSION: ICD-10-CM

## 2019-09-10 DIAGNOSIS — M54.41 CHRONIC BILATERAL LOW BACK PAIN WITH RIGHT-SIDED SCIATICA: ICD-10-CM

## 2019-09-10 DIAGNOSIS — J06.9 UPPER RESPIRATORY TRACT INFECTION, UNSPECIFIED TYPE: Primary | ICD-10-CM

## 2019-09-10 DIAGNOSIS — G89.29 CHRONIC BILATERAL LOW BACK PAIN WITH RIGHT-SIDED SCIATICA: ICD-10-CM

## 2019-09-10 LAB
ALBUMIN SERPL-MCNC: 3.8 G/DL (ref 3.4–5)
ALBUMIN/GLOB SERPL: 1 {RATIO} (ref 1–2)
ALP LIVER SERPL-CCNC: 71 U/L (ref 45–117)
ALT SERPL-CCNC: 24 U/L (ref 16–61)
ANION GAP SERPL CALC-SCNC: 8 MMOL/L (ref 0–18)
AST SERPL-CCNC: 15 U/L (ref 15–37)
BASOPHILS # BLD AUTO: 0.04 X10(3) UL (ref 0–0.2)
BASOPHILS NFR BLD AUTO: 0.3 %
BILIRUB SERPL-MCNC: 0.5 MG/DL (ref 0.1–2)
BUN BLD-MCNC: 16 MG/DL (ref 7–18)
BUN/CREAT SERPL: 13.9 (ref 10–20)
CALCIUM BLD-MCNC: 9.1 MG/DL (ref 8.5–10.1)
CHLORIDE SERPL-SCNC: 105 MMOL/L (ref 98–112)
CO2 SERPL-SCNC: 26 MMOL/L (ref 21–32)
CREAT BLD-MCNC: 1.15 MG/DL (ref 0.7–1.3)
DEPRECATED RDW RBC AUTO: 39.3 FL (ref 35.1–46.3)
EOSINOPHIL # BLD AUTO: 0.21 X10(3) UL (ref 0–0.7)
EOSINOPHIL NFR BLD AUTO: 1.7 %
ERYTHROCYTE [DISTWIDTH] IN BLOOD BY AUTOMATED COUNT: 12.2 % (ref 11–15)
GLOBULIN PLAS-MCNC: 4 G/DL (ref 2.8–4.4)
GLUCOSE BLD-MCNC: 113 MG/DL (ref 70–99)
HCT VFR BLD AUTO: 43.6 % (ref 39–53)
HGB BLD-MCNC: 14.8 G/DL (ref 13–17.5)
IMM GRANULOCYTES # BLD AUTO: 0.04 X10(3) UL (ref 0–1)
IMM GRANULOCYTES NFR BLD: 0.3 %
LYMPHOCYTES # BLD AUTO: 2.12 X10(3) UL (ref 1–4)
LYMPHOCYTES NFR BLD AUTO: 17.3 %
M PROTEIN MFR SERPL ELPH: 7.8 G/DL (ref 6.4–8.2)
MCH RBC QN AUTO: 30 PG (ref 26–34)
MCHC RBC AUTO-ENTMCNC: 33.9 G/DL (ref 31–37)
MCV RBC AUTO: 88.4 FL (ref 80–100)
MONOCYTES # BLD AUTO: 1.3 X10(3) UL (ref 0.1–1)
MONOCYTES NFR BLD AUTO: 10.6 %
NEUTROPHILS # BLD AUTO: 8.51 X10 (3) UL (ref 1.5–7.7)
NEUTROPHILS # BLD AUTO: 8.51 X10(3) UL (ref 1.5–7.7)
NEUTROPHILS NFR BLD AUTO: 69.8 %
OSMOLALITY SERPL CALC.SUM OF ELEC: 290 MOSM/KG (ref 275–295)
PATIENT FASTING: NO
PLATELET # BLD AUTO: 296 10(3)UL (ref 150–450)
POTASSIUM SERPL-SCNC: 3.7 MMOL/L (ref 3.5–5.1)
RBC # BLD AUTO: 4.93 X10(6)UL (ref 4.3–5.7)
SODIUM SERPL-SCNC: 139 MMOL/L (ref 136–145)
WBC # BLD AUTO: 12.2 X10(3) UL (ref 4–11)

## 2019-09-10 PROCEDURE — 36415 COLL VENOUS BLD VENIPUNCTURE: CPT

## 2019-09-10 PROCEDURE — 80053 COMPREHEN METABOLIC PANEL: CPT

## 2019-09-10 PROCEDURE — 99214 OFFICE O/P EST MOD 30 MIN: CPT | Performed by: INTERNAL MEDICINE

## 2019-09-10 PROCEDURE — 85025 COMPLETE CBC W/AUTO DIFF WBC: CPT

## 2019-09-10 RX ORDER — CYCLOBENZAPRINE HCL 10 MG
10 TABLET ORAL NIGHTLY PRN
Qty: 30 TABLET | Refills: 1 | Status: SHIPPED | OUTPATIENT
Start: 2019-09-10 | End: 2021-02-01

## 2019-09-10 RX ORDER — ZOLPIDEM TARTRATE 12.5 MG/1
TABLET, FILM COATED, EXTENDED RELEASE ORAL
Qty: 30 TABLET | Refills: 5 | Status: SHIPPED | OUTPATIENT
Start: 2019-09-10 | End: 2021-02-01 | Stop reason: ALTCHOICE

## 2019-09-10 NOTE — ASSESSMENT & PLAN NOTE
Patient complains of chronic back pain, however the Toradol causes nausea.   I will try giving him a muscle relaxant to take at night, since that is when his pain bothers him the most.

## 2019-09-10 NOTE — ASSESSMENT & PLAN NOTE
Patient likely has a viral upper respiratory infection. He does have sinus pressure and yellow drainage. I advised him to push fluids and to call if he is not better in the next 2 to 3 days.

## 2019-09-10 NOTE — ASSESSMENT & PLAN NOTE
Patient complains of chronic insomnia. I advised him to avoid screen time prior to bed. He states he cannot sleep unless he takes Ambien every night. We will continue the Ambien.

## 2019-10-09 DIAGNOSIS — I10 ESSENTIAL HYPERTENSION: ICD-10-CM

## 2019-10-10 RX ORDER — LISINOPRIL AND HYDROCHLOROTHIAZIDE 20; 12.5 MG/1; MG/1
TABLET ORAL
Qty: 90 TABLET | Refills: 1 | Status: SHIPPED | OUTPATIENT
Start: 2019-10-10 | End: 2020-04-15

## 2019-10-11 NOTE — TELEPHONE ENCOUNTER
Refill passed per [facility], [facility] protocol.   Hypertensive Medications  Protocol Criteria:  · Appointment scheduled in the past 6 months or in the next 3 months  · BMP or CMP in the past 12 months  · Creatinine result < 2  Recent Outpatient Visits

## 2019-11-02 DIAGNOSIS — F51.01 PRIMARY INSOMNIA: ICD-10-CM

## 2019-11-02 RX ORDER — ZOLPIDEM TARTRATE 12.5 MG/1
TABLET, FILM COATED, EXTENDED RELEASE ORAL
Qty: 30 TABLET | OUTPATIENT
Start: 2019-11-02

## 2019-11-05 ENCOUNTER — NURSE TRIAGE (OUTPATIENT)
Dept: INTERNAL MEDICINE CLINIC | Facility: CLINIC | Age: 49
End: 2019-11-05

## 2019-11-05 ENCOUNTER — TELEPHONE (OUTPATIENT)
Dept: INTERNAL MEDICINE CLINIC | Facility: CLINIC | Age: 49
End: 2019-11-05

## 2019-11-05 DIAGNOSIS — F51.01 PRIMARY INSOMNIA: ICD-10-CM

## 2019-11-05 NOTE — TELEPHONE ENCOUNTER
That is odd. It does say that it was received and it sounds like it is the correct pharmacy. Please call it in. Is it possible that it needs prior auth and that is why it didn't go through?

## 2019-11-05 NOTE — TELEPHONE ENCOUNTER
He can see [facility]. [phone] or I can see him in the next available appointment that I have. Please do not use res24. I have some available appointments at [facility]. This is a chronic problem.

## 2019-11-05 NOTE — TELEPHONE ENCOUNTER
Action Requested: Summary for Provider     []  Critical Lab, Recommendations Needed  [] Need Additional Advice  []   FYI    []   Need Orders  [] Need Medications Sent to Pharmacy  []  Other     SUMMARY: Dr Hakeem Crocker, please advise.  Patient is willing to be s

## 2019-11-05 NOTE — TELEPHONE ENCOUNTER
Patient called in stating that he is out of medication below. Pharmacy is requesting a refill, however, the refill was denied in the office. CSS sees that on 9/10 script was sent with 5 refills, but unsure what the Pharmacy has on file.      Confirmed p

## 2019-11-05 NOTE — TELEPHONE ENCOUNTER
Patient had OV on 9/10 with Dr. Sera Delarosa and prescription for Zolpidem 12.5 mg #30 with 5 refills were ordered to [facility] in [place]:    Requested Prescriptions             Signed Prescriptions Disp Refills   • Cyclobenzaprine HCl 10 MG Oral Tab 30 tabl

## 2019-11-05 NOTE — TELEPHONE ENCOUNTER
Patient states that he's been having an issue with his left elbow. He states that it is occasionally with movement but more often when he touches it with blanket/pillow he will have a sharp shooting pain. He states that it comes and goes.      He is req

## 2019-11-05 NOTE — TELEPHONE ENCOUNTER
Called [facility] , spoke with Flavia bernalriied the RX for both ; refills are available , Pharmacist will notify the patient when RX is ready for .

## 2019-11-06 RX ORDER — ZOLPIDEM TARTRATE 12.5 MG/1
TABLET, FILM COATED, EXTENDED RELEASE ORAL
Qty: 30 TABLET | Refills: 1 | Status: SHIPPED | OUTPATIENT
Start: 2019-11-06 | End: 2020-01-31

## 2019-11-07 NOTE — TELEPHONE ENCOUNTER
Controlled medication pending for review. If approved needs to be called in or faxed by on-site staff.      Last Rx: 9/10/19  LOV: 1 month ago

## 2020-01-05 DIAGNOSIS — K21.9 GASTROESOPHAGEAL REFLUX DISEASE WITHOUT ESOPHAGITIS: ICD-10-CM

## 2020-01-06 RX ORDER — OMEPRAZOLE 20 MG/1
CAPSULE, DELAYED RELEASE ORAL
Qty: 90 CAPSULE | Refills: 1 | Status: SHIPPED | OUTPATIENT
Start: 2020-01-06 | End: 2020-07-17

## 2020-01-07 NOTE — TELEPHONE ENCOUNTER
Refill Protocol Appointment Criteria  · Appointment scheduled in the past 12 months or in the next 3 months  Recent Outpatient Visits            3 months ago Upper respiratory tract infection, unspecified type    [facility], [address], [place] [facility]

## 2020-01-31 DIAGNOSIS — F51.01 PRIMARY INSOMNIA: ICD-10-CM

## 2020-01-31 NOTE — TELEPHONE ENCOUNTER
Review pended refill request.  It does not fall under a protocol.      Requested Prescriptions     Pending Prescriptions Disp Refills   • ZOLPIDEM TARTRATE ER 12.5 MG Oral Tab CR [Pharmacy Med Name: ZOLPIDEM ER 12.5MG TABLETS] 30 tablet 0     Sig: TAKE 1 TA

## 2020-02-01 RX ORDER — ZOLPIDEM TARTRATE 12.5 MG/1
TABLET, FILM COATED, EXTENDED RELEASE ORAL
Qty: 90 TABLET | Refills: 1 | Status: SHIPPED | OUTPATIENT
Start: 2020-02-01 | End: 2020-07-20

## 2020-04-07 DIAGNOSIS — F51.01 PRIMARY INSOMNIA: ICD-10-CM

## 2020-04-09 RX ORDER — ZOLPIDEM TARTRATE 12.5 MG/1
TABLET, FILM COATED, EXTENDED RELEASE ORAL
Qty: 30 TABLET | Refills: 0 | OUTPATIENT
Start: 2020-04-09

## 2020-04-15 DIAGNOSIS — I10 ESSENTIAL HYPERTENSION: ICD-10-CM

## 2020-04-15 RX ORDER — LISINOPRIL AND HYDROCHLOROTHIAZIDE 20; 12.5 MG/1; MG/1
TABLET ORAL
Qty: 90 TABLET | Refills: 0 | Status: SHIPPED | OUTPATIENT
Start: 2020-04-15 | End: 2020-07-17

## 2020-07-16 DIAGNOSIS — K21.9 GASTROESOPHAGEAL REFLUX DISEASE WITHOUT ESOPHAGITIS: ICD-10-CM

## 2020-07-16 DIAGNOSIS — I10 ESSENTIAL HYPERTENSION: ICD-10-CM

## 2020-07-17 RX ORDER — LISINOPRIL AND HYDROCHLOROTHIAZIDE 20; 12.5 MG/1; MG/1
TABLET ORAL
Qty: 90 TABLET | Refills: 0 | Status: SHIPPED | OUTPATIENT
Start: 2020-07-17 | End: 2020-10-14

## 2020-07-17 RX ORDER — OMEPRAZOLE 20 MG/1
CAPSULE, DELAYED RELEASE ORAL
Qty: 90 CAPSULE | Refills: 1 | Status: SHIPPED | OUTPATIENT
Start: 2020-07-17 | End: 2021-02-01

## 2020-07-20 ENCOUNTER — VIRTUAL PHONE E/M (OUTPATIENT)
Dept: INTERNAL MEDICINE CLINIC | Facility: CLINIC | Age: 50
End: 2020-07-20
Payer: COMMERCIAL

## 2020-07-20 DIAGNOSIS — F51.01 PRIMARY INSOMNIA: ICD-10-CM

## 2020-07-20 DIAGNOSIS — I10 ESSENTIAL HYPERTENSION: Primary | ICD-10-CM

## 2020-07-20 PROCEDURE — 99213 OFFICE O/P EST LOW 20 MIN: CPT | Performed by: NURSE PRACTITIONER

## 2020-07-20 RX ORDER — ZOLPIDEM TARTRATE 12.5 MG/1
12.5 TABLET, FILM COATED, EXTENDED RELEASE ORAL NIGHTLY PRN
Qty: 90 TABLET | Refills: 1 | Status: SHIPPED | OUTPATIENT
Start: 2020-07-20 | End: 2021-02-01

## 2020-07-20 NOTE — ASSESSMENT & PLAN NOTE
A/P patient with a history of hypertension he takes lisinopril hydrochlorothiazide 20-12.5 mg daily. Blood pressure 1/23/1984 at this visit. Patient states his blood pressure is well controlled on this medication.

## 2020-07-20 NOTE — PROGRESS NOTES
HPI:    Patient ID: Jo-Ann Phillips is a 54year old male. Please note that the following visit was completed using two-way, real-time interactive audio and/or video communication.   This has been done in good cheo to provide continuity of care in the Repair of nasal septum  2009   • Vein ligation and stripping Bilateral 2002      Social History    Socioeconomic History      Marital status: Single      Spouse name: Not on file      Number of children: 3      Years of education: Not on file      Highest BY MOUTH EVERY NIGHT AT BEDTIME AS NEEDED FOR SLEEP 30 tablet 5   • TraMADol HCl 50 MG Oral Tab Take 1 tablet (50 mg total) by mouth 2 (two) times daily as needed for Pain.  60 tablet 0   • PEG 3350-KCl-Na Bicarb-NaCl (TRILYTE) 420 g Oral Recon Soln Split d tablet 1     Sig: Take 1 tablet (12.5 mg total) by mouth nightly as needed for Sleep.  at bedtime       Imaging & Referrals:  None     Will order his labs and he will follow-up his annual physical with his PCP    JOHAN Burns

## 2020-07-20 NOTE — ASSESSMENT & PLAN NOTE
A/P patient has chronic insomnia and has been on Ambien per his PCP.   Patient requesting a refill on Ambien

## 2020-08-15 DIAGNOSIS — F51.01 PRIMARY INSOMNIA: ICD-10-CM

## 2020-08-17 RX ORDER — ZOLPIDEM TARTRATE 12.5 MG/1
TABLET, FILM COATED, EXTENDED RELEASE ORAL
Qty: 90 TABLET | Refills: 0 | OUTPATIENT
Start: 2020-08-17

## 2020-10-14 DIAGNOSIS — I10 ESSENTIAL HYPERTENSION: ICD-10-CM

## 2020-10-14 RX ORDER — LISINOPRIL AND HYDROCHLOROTHIAZIDE 20; 12.5 MG/1; MG/1
TABLET ORAL
Qty: 30 TABLET | Refills: 0 | Status: SHIPPED | OUTPATIENT
Start: 2020-10-14 | End: 2020-11-12

## 2020-10-27 ENCOUNTER — TELEPHONE (OUTPATIENT)
Dept: INTERNAL MEDICINE CLINIC | Facility: CLINIC | Age: 50
End: 2020-10-27

## 2020-10-27 NOTE — TELEPHONE ENCOUNTER
Patient came to [facility] he is currently out of the medication. Patient took his last dose 3 days ago. Has not been able to get any sleep.  Patient is tentatively scheduled for annual with PCP Feb 1st.    Pls advise

## 2020-10-27 NOTE — TELEPHONE ENCOUNTER
Current Outpatient Medications   Medication Sig Dispense Refill   • Zolpidem Tartrate ER 12.5 MG Oral Tab CR TAKE 1 TABLET BY MOUTH EVERY NIGHT AT BEDTIME AS NEEDED FOR SLEEP 30 tablet 5

## 2020-11-11 DIAGNOSIS — I10 ESSENTIAL HYPERTENSION: ICD-10-CM

## 2020-11-12 RX ORDER — LISINOPRIL AND HYDROCHLOROTHIAZIDE 20; 12.5 MG/1; MG/1
TABLET ORAL
Qty: 90 TABLET | Refills: 0 | Status: SHIPPED | OUTPATIENT
Start: 2020-11-12 | End: 2021-02-01

## 2021-02-01 ENCOUNTER — OFFICE VISIT (OUTPATIENT)
Dept: INTERNAL MEDICINE CLINIC | Facility: CLINIC | Age: 51
End: 2021-02-01
Payer: COMMERCIAL

## 2021-02-01 VITALS
HEIGHT: 69.5 IN | SYSTOLIC BLOOD PRESSURE: 114 MMHG | WEIGHT: 169.88 LBS | BODY MASS INDEX: 24.6 KG/M2 | HEART RATE: 76 BPM | DIASTOLIC BLOOD PRESSURE: 78 MMHG

## 2021-02-01 DIAGNOSIS — M79.10 MYALGIA: ICD-10-CM

## 2021-02-01 DIAGNOSIS — Z00.00 ROUTINE HEALTH MAINTENANCE: Primary | ICD-10-CM

## 2021-02-01 DIAGNOSIS — K21.9 GASTROESOPHAGEAL REFLUX DISEASE WITHOUT ESOPHAGITIS: ICD-10-CM

## 2021-02-01 DIAGNOSIS — F51.01 PRIMARY INSOMNIA: ICD-10-CM

## 2021-02-01 DIAGNOSIS — G89.29 CHRONIC BILATERAL LOW BACK PAIN WITH RIGHT-SIDED SCIATICA: ICD-10-CM

## 2021-02-01 DIAGNOSIS — I10 ESSENTIAL HYPERTENSION: ICD-10-CM

## 2021-02-01 DIAGNOSIS — M54.41 CHRONIC BILATERAL LOW BACK PAIN WITH RIGHT-SIDED SCIATICA: ICD-10-CM

## 2021-02-01 PROCEDURE — 3078F DIAST BP <80 MM HG: CPT | Performed by: INTERNAL MEDICINE

## 2021-02-01 PROCEDURE — 99396 PREV VISIT EST AGE 40-64: CPT | Performed by: INTERNAL MEDICINE

## 2021-02-01 PROCEDURE — 3074F SYST BP LT 130 MM HG: CPT | Performed by: INTERNAL MEDICINE

## 2021-02-01 PROCEDURE — 99214 OFFICE O/P EST MOD 30 MIN: CPT | Performed by: INTERNAL MEDICINE

## 2021-02-01 PROCEDURE — 3008F BODY MASS INDEX DOCD: CPT | Performed by: INTERNAL MEDICINE

## 2021-02-01 RX ORDER — ZOLPIDEM TARTRATE 12.5 MG/1
12.5 TABLET, FILM COATED, EXTENDED RELEASE ORAL NIGHTLY PRN
Qty: 90 TABLET | Refills: 1 | Status: SHIPPED | OUTPATIENT
Start: 2021-02-01 | End: 2021-06-29

## 2021-02-01 RX ORDER — OMEPRAZOLE 20 MG/1
CAPSULE, DELAYED RELEASE ORAL
Qty: 90 CAPSULE | Refills: 1 | Status: SHIPPED | OUTPATIENT
Start: 2021-02-01 | End: 2021-08-04

## 2021-02-01 RX ORDER — CYCLOBENZAPRINE HCL 10 MG
10 TABLET ORAL NIGHTLY PRN
Qty: 90 TABLET | Refills: 1 | Status: SHIPPED | OUTPATIENT
Start: 2021-02-01

## 2021-02-01 RX ORDER — LISINOPRIL AND HYDROCHLOROTHIAZIDE 20; 12.5 MG/1; MG/1
TABLET ORAL
Qty: 90 TABLET | Refills: 1 | Status: SHIPPED | OUTPATIENT
Start: 2021-02-01 | End: 2021-06-17

## 2021-02-01 NOTE — TELEPHONE ENCOUNTER
Current Outpatient Medications   Medication Sig Dispense Refill   • Zolpidem Tartrate ER 12.5 MG Oral Tab CR Take 1 tablet (12.5 mg total) by mouth nightly as needed for Sleep.  at bedtime 90 tablet 1

## 2021-02-02 NOTE — PROGRESS NOTES
HPI:    Patient ID: Nuria Daley is a 52year old male. HPI: Pt is here for a physical and he needs refills of his medications. Pt c/o myalgias when he does work around the house for the past few months. He does not have problems with ADLs.   C/o l Use      Smoking status: Never Smoker      Smokeless tobacco: Never Used    Alcohol use:  Yes      Alcohol/week: 0.0 standard drinks      Comment: occasionally    Drug use: No    Family History   Problem Relation Age of Onset   • Diabetes Father    • Heart Neck supple. No thyromegaly present. Cardiovascular: Normal rate, regular rhythm and normal heart sounds. No murmur heard. Edema not present. Pulmonary/Chest: Effort normal and breath sounds normal. No respiratory distress. He has no wheezes.    Abd Continue present management. Relevant Medications    cyclobenzaprine 10 MG Oral Tab    Myalgia     Patient mostly has muscle pain in his thighs. This is relieved with ibuprofen which I advised him to take as needed.   I will check a sed rate and C-

## 2021-02-02 NOTE — ASSESSMENT & PLAN NOTE
Unremarkable exam.  Counseled regarding exercise and healthy diet. Immunizations were reviewed. Patient declined the flu vaccine. I discussed the process for the Covid vaccine.

## 2021-02-02 NOTE — PATIENT INSTRUCTIONS
Do fasting labs soon. Fast for 12 hours. Water is okay. Please schedule your blood test by calling central registration at [phone] or go to [email or web address]. Or you can walk-in without an appointment.     Do not take vitamins or supplemen

## 2021-02-02 NOTE — ASSESSMENT & PLAN NOTE
Patient mostly has muscle pain in his thighs. This is relieved with ibuprofen which I advised him to take as needed. I will check a sed rate and C-reactive protein.

## 2021-02-06 ENCOUNTER — LAB ENCOUNTER (OUTPATIENT)
Dept: LAB | Facility: HOSPITAL | Age: 51
End: 2021-02-06
Attending: INTERNAL MEDICINE
Payer: COMMERCIAL

## 2021-02-06 DIAGNOSIS — Z00.00 ROUTINE HEALTH MAINTENANCE: ICD-10-CM

## 2021-02-06 DIAGNOSIS — M79.10 MYALGIA: ICD-10-CM

## 2021-02-06 LAB
ALBUMIN SERPL-MCNC: 3.7 G/DL (ref 3.4–5)
ALBUMIN/GLOB SERPL: 1 {RATIO} (ref 1–2)
ALP LIVER SERPL-CCNC: 66 U/L
ALT SERPL-CCNC: 29 U/L
ANION GAP SERPL CALC-SCNC: 4 MMOL/L (ref 0–18)
AST SERPL-CCNC: 15 U/L (ref 15–37)
BASOPHILS # BLD AUTO: 0.04 X10(3) UL (ref 0–0.2)
BASOPHILS NFR BLD AUTO: 0.5 %
BILIRUB SERPL-MCNC: 0.6 MG/DL (ref 0.1–2)
BUN BLD-MCNC: 19 MG/DL (ref 7–18)
BUN/CREAT SERPL: 16.2 (ref 10–20)
CALCIUM BLD-MCNC: 9 MG/DL (ref 8.5–10.1)
CHLORIDE SERPL-SCNC: 107 MMOL/L (ref 98–112)
CHOLEST SMN-MCNC: 210 MG/DL (ref ?–200)
CO2 SERPL-SCNC: 26 MMOL/L (ref 21–32)
COMPLEXED PSA SERPL-MCNC: 1.31 NG/ML (ref ?–4)
CREAT BLD-MCNC: 1.17 MG/DL
CRP SERPL-MCNC: <0.29 MG/DL (ref ?–0.3)
DEPRECATED RDW RBC AUTO: 38.2 FL (ref 35.1–46.3)
EOSINOPHIL # BLD AUTO: 0.16 X10(3) UL (ref 0–0.7)
EOSINOPHIL NFR BLD AUTO: 2.2 %
ERYTHROCYTE [DISTWIDTH] IN BLOOD BY AUTOMATED COUNT: 12.1 % (ref 11–15)
ERYTHROCYTE [SEDIMENTATION RATE] IN BLOOD: 8 MM/HR
GLOBULIN PLAS-MCNC: 3.8 G/DL (ref 2.8–4.4)
GLUCOSE BLD-MCNC: 101 MG/DL (ref 70–99)
HCT VFR BLD AUTO: 42.5 %
HDLC SERPL-MCNC: 43 MG/DL (ref 40–59)
HGB BLD-MCNC: 14.6 G/DL
IMM GRANULOCYTES # BLD AUTO: 0.02 X10(3) UL (ref 0–1)
IMM GRANULOCYTES NFR BLD: 0.3 %
LDLC SERPL CALC-MCNC: 145 MG/DL (ref ?–100)
LYMPHOCYTES # BLD AUTO: 1.95 X10(3) UL (ref 1–4)
LYMPHOCYTES NFR BLD AUTO: 26.5 %
M PROTEIN MFR SERPL ELPH: 7.5 G/DL (ref 6.4–8.2)
MCH RBC QN AUTO: 29.7 PG (ref 26–34)
MCHC RBC AUTO-ENTMCNC: 34.4 G/DL (ref 31–37)
MCV RBC AUTO: 86.6 FL
MONOCYTES # BLD AUTO: 0.72 X10(3) UL (ref 0.1–1)
MONOCYTES NFR BLD AUTO: 9.8 %
NEUTROPHILS # BLD AUTO: 4.46 X10 (3) UL (ref 1.5–7.7)
NEUTROPHILS # BLD AUTO: 4.46 X10(3) UL (ref 1.5–7.7)
NEUTROPHILS NFR BLD AUTO: 60.7 %
NONHDLC SERPL-MCNC: 167 MG/DL (ref ?–130)
OSMOLALITY SERPL CALC.SUM OF ELEC: 286 MOSM/KG (ref 275–295)
PATIENT FASTING Y/N/NP: YES
PATIENT FASTING Y/N/NP: YES
PLATELET # BLD AUTO: 273 10(3)UL (ref 150–450)
POTASSIUM SERPL-SCNC: 4.4 MMOL/L (ref 3.5–5.1)
RBC # BLD AUTO: 4.91 X10(6)UL
SODIUM SERPL-SCNC: 137 MMOL/L (ref 136–145)
TRIGL SERPL-MCNC: 108 MG/DL (ref 30–149)
TSI SER-ACNC: 1.07 MIU/ML (ref 0.36–3.74)
VLDLC SERPL CALC-MCNC: 22 MG/DL (ref 0–30)
WBC # BLD AUTO: 7.4 X10(3) UL (ref 4–11)

## 2021-02-06 PROCEDURE — 85652 RBC SED RATE AUTOMATED: CPT

## 2021-02-06 PROCEDURE — 85025 COMPLETE CBC W/AUTO DIFF WBC: CPT

## 2021-02-06 PROCEDURE — 36415 COLL VENOUS BLD VENIPUNCTURE: CPT

## 2021-02-06 PROCEDURE — 80061 LIPID PANEL: CPT

## 2021-02-06 PROCEDURE — 84443 ASSAY THYROID STIM HORMONE: CPT

## 2021-02-06 PROCEDURE — 86140 C-REACTIVE PROTEIN: CPT

## 2021-02-06 PROCEDURE — 80053 COMPREHEN METABOLIC PANEL: CPT

## 2021-06-17 DIAGNOSIS — I10 ESSENTIAL HYPERTENSION: ICD-10-CM

## 2021-06-17 RX ORDER — LISINOPRIL AND HYDROCHLOROTHIAZIDE 20; 12.5 MG/1; MG/1
TABLET ORAL
Qty: 90 TABLET | Refills: 1 | Status: SHIPPED | OUTPATIENT
Start: 2021-06-17 | End: 2022-02-03

## 2021-06-29 ENCOUNTER — TELEPHONE (OUTPATIENT)
Dept: INTERNAL MEDICINE CLINIC | Facility: CLINIC | Age: 51
End: 2021-06-29

## 2021-06-29 ENCOUNTER — LAB ENCOUNTER (OUTPATIENT)
Dept: LAB | Age: 51
End: 2021-06-29
Attending: INTERNAL MEDICINE
Payer: COMMERCIAL

## 2021-06-29 ENCOUNTER — OFFICE VISIT (OUTPATIENT)
Dept: INTERNAL MEDICINE CLINIC | Facility: CLINIC | Age: 51
End: 2021-06-29
Payer: COMMERCIAL

## 2021-06-29 VITALS
HEIGHT: 69.5 IN | SYSTOLIC BLOOD PRESSURE: 131 MMHG | RESPIRATION RATE: 19 BRPM | HEART RATE: 73 BPM | BODY MASS INDEX: 24.76 KG/M2 | DIASTOLIC BLOOD PRESSURE: 84 MMHG | WEIGHT: 171 LBS

## 2021-06-29 DIAGNOSIS — F51.01 PRIMARY INSOMNIA: ICD-10-CM

## 2021-06-29 DIAGNOSIS — R19.7 DIARRHEA OF PRESUMED INFECTIOUS ORIGIN: Primary | ICD-10-CM

## 2021-06-29 DIAGNOSIS — R19.7 DIARRHEA OF PRESUMED INFECTIOUS ORIGIN: ICD-10-CM

## 2021-06-29 LAB
ALBUMIN SERPL-MCNC: 3.7 G/DL (ref 3.4–5)
ALBUMIN/GLOB SERPL: 0.9 {RATIO} (ref 1–2)
ALP LIVER SERPL-CCNC: 60 U/L
ALT SERPL-CCNC: 33 U/L
ANION GAP SERPL CALC-SCNC: 6 MMOL/L (ref 0–18)
AST SERPL-CCNC: 19 U/L (ref 15–37)
BASOPHILS # BLD AUTO: 0.04 X10(3) UL (ref 0–0.2)
BASOPHILS NFR BLD AUTO: 0.4 %
BILIRUB SERPL-MCNC: 0.7 MG/DL (ref 0.1–2)
BUN BLD-MCNC: 13 MG/DL (ref 7–18)
BUN/CREAT SERPL: 11.5 (ref 10–20)
CALCIUM BLD-MCNC: 9 MG/DL (ref 8.5–10.1)
CHLORIDE SERPL-SCNC: 104 MMOL/L (ref 98–112)
CO2 SERPL-SCNC: 28 MMOL/L (ref 21–32)
CREAT BLD-MCNC: 1.13 MG/DL
DEPRECATED RDW RBC AUTO: 39.6 FL (ref 35.1–46.3)
EOSINOPHIL # BLD AUTO: 0.2 X10(3) UL (ref 0–0.7)
EOSINOPHIL NFR BLD AUTO: 2.1 %
ERYTHROCYTE [DISTWIDTH] IN BLOOD BY AUTOMATED COUNT: 12.1 % (ref 11–15)
GLOBULIN PLAS-MCNC: 4 G/DL (ref 2.8–4.4)
GLUCOSE BLD-MCNC: 88 MG/DL (ref 70–99)
HCT VFR BLD AUTO: 45 %
HGB BLD-MCNC: 15.1 G/DL
IMM GRANULOCYTES # BLD AUTO: 0.01 X10(3) UL (ref 0–1)
IMM GRANULOCYTES NFR BLD: 0.1 %
LYMPHOCYTES # BLD AUTO: 2.37 X10(3) UL (ref 1–4)
LYMPHOCYTES NFR BLD AUTO: 25 %
M PROTEIN MFR SERPL ELPH: 7.7 G/DL (ref 6.4–8.2)
MCH RBC QN AUTO: 29.7 PG (ref 26–34)
MCHC RBC AUTO-ENTMCNC: 33.6 G/DL (ref 31–37)
MCV RBC AUTO: 88.6 FL
MONOCYTES # BLD AUTO: 1.15 X10(3) UL (ref 0.1–1)
MONOCYTES NFR BLD AUTO: 12.1 %
NEUTROPHILS # BLD AUTO: 5.72 X10 (3) UL (ref 1.5–7.7)
NEUTROPHILS # BLD AUTO: 5.72 X10(3) UL (ref 1.5–7.7)
NEUTROPHILS NFR BLD AUTO: 60.3 %
OSMOLALITY SERPL CALC.SUM OF ELEC: 286 MOSM/KG (ref 275–295)
PATIENT FASTING Y/N/NP: NO
PLATELET # BLD AUTO: 279 10(3)UL (ref 150–450)
POTASSIUM SERPL-SCNC: 3.9 MMOL/L (ref 3.5–5.1)
RBC # BLD AUTO: 5.08 X10(6)UL
SODIUM SERPL-SCNC: 138 MMOL/L (ref 136–145)
WBC # BLD AUTO: 9.5 X10(3) UL (ref 4–11)

## 2021-06-29 PROCEDURE — 85025 COMPLETE CBC W/AUTO DIFF WBC: CPT

## 2021-06-29 PROCEDURE — 80053 COMPREHEN METABOLIC PANEL: CPT

## 2021-06-29 PROCEDURE — 3008F BODY MASS INDEX DOCD: CPT | Performed by: INTERNAL MEDICINE

## 2021-06-29 PROCEDURE — 3079F DIAST BP 80-89 MM HG: CPT | Performed by: INTERNAL MEDICINE

## 2021-06-29 PROCEDURE — 36415 COLL VENOUS BLD VENIPUNCTURE: CPT

## 2021-06-29 PROCEDURE — 99214 OFFICE O/P EST MOD 30 MIN: CPT | Performed by: INTERNAL MEDICINE

## 2021-06-29 PROCEDURE — 3075F SYST BP GE 130 - 139MM HG: CPT | Performed by: INTERNAL MEDICINE

## 2021-06-29 RX ORDER — ONDANSETRON 8 MG/1
TABLET, ORALLY DISINTEGRATING ORAL
COMMUNITY
Start: 2021-06-25

## 2021-06-29 RX ORDER — ZOLPIDEM TARTRATE 12.5 MG/1
12.5 TABLET, FILM COATED, EXTENDED RELEASE ORAL NIGHTLY PRN
Qty: 30 TABLET | Refills: 3 | Status: SHIPPED | OUTPATIENT
Start: 2021-06-29 | End: 2021-11-02

## 2021-06-29 NOTE — TELEPHONE ENCOUNTER
Patient reports was seen in [facility] Fri 6/25 for nausea and abdominal cramps, covid test was neg, also reports is vaccinated.  Reports they advised symptoms due to virus but nausea continues all day, has vomited a little before, diarrhea before but improved, no

## 2021-07-03 ENCOUNTER — LAB ENCOUNTER (OUTPATIENT)
Dept: LAB | Facility: HOSPITAL | Age: 51
End: 2021-07-03
Attending: INTERNAL MEDICINE
Payer: COMMERCIAL

## 2021-07-03 DIAGNOSIS — R19.7 DIARRHEA OF PRESUMED INFECTIOUS ORIGIN: ICD-10-CM

## 2021-07-03 PROCEDURE — 87045 FECES CULTURE AEROBIC BACT: CPT

## 2021-07-03 PROCEDURE — 87077 CULTURE AEROBIC IDENTIFY: CPT

## 2021-07-03 PROCEDURE — 87329 GIARDIA AG IA: CPT

## 2021-07-03 PROCEDURE — 87427 SHIGA-LIKE TOXIN AG IA: CPT

## 2021-07-03 PROCEDURE — 87493 C DIFF AMPLIFIED PROBE: CPT

## 2021-07-03 PROCEDURE — 87272 CRYPTOSPORIDIUM AG IF: CPT

## 2021-07-03 PROCEDURE — 87046 STOOL CULTR AEROBIC BACT EA: CPT

## 2021-07-03 NOTE — PROGRESS NOTES
HPI/Subjective:   Patient ID: Lizett Leon is a 52year old male.   Presents for evaluation of diarrhea    HPI  Patient reports that he has been having diarrhea for about a week, initially felt fatigued and diarrhea was very watery associated with abdom directed prior to intercourse     Tri-Mix Standard Recipe:  - Prostaglandin E1 5.88 ug/ml  - Papaverine HCI 18mg/ ml  - Phentolamine Mesylate 0.6 mg/ ml 5 mL 6   • hydrocortisone (ANUSOL-HC) 2.5 % Rectal Cream Place 1 Application rectally 2 (two) times efren infectious origin  (primary encounter diagnosis) etiology not clear, advised to patient to stay hydrated, avoid high-fiber diet and dairy. We will check blood test and stool studies rule out infectious etiology report if diarrhea persist beyond 3 weeks.

## 2021-07-05 LAB
CRYPTOSP AG STL QL IA: NEGATIVE
G LAMBLIA AG STL QL IA: NEGATIVE

## 2021-07-06 LAB — C DIFF TOX B STL QL: NEGATIVE

## 2021-08-04 DIAGNOSIS — K21.9 GASTROESOPHAGEAL REFLUX DISEASE WITHOUT ESOPHAGITIS: ICD-10-CM

## 2021-08-04 RX ORDER — OMEPRAZOLE 20 MG/1
CAPSULE, DELAYED RELEASE ORAL
Qty: 90 CAPSULE | Refills: 1 | Status: SHIPPED | OUTPATIENT
Start: 2021-08-04 | End: 2022-01-29

## 2021-08-04 NOTE — TELEPHONE ENCOUNTER
Refill passed per [facility] protocol.       Requested Prescriptions   Pending Prescriptions Disp Refills    OMEPRAZOLE 20 MG Oral Capsule Delayed Release [Pharmacy Med Name: OMEPRAZOLE 20MG CAPSULES] 90 capsule 1     Sig: TAKE 1 CAPSULE BY MOUTH EVERY MORNING        Gastrointestional Medication Protocol Passed - 8/4/2021  2:09 PM        Passed - Appointment in past 15 or next 3 months               Future Appointments         Provider Department Appt Notes    In 2 months Hilario Esquivel MD [place] [facility], [address], [place] F/U            Recent Outpatient Visits              1 month ago Diarrhea of presumed infectious origin    Mckenzie Miller [place], MD    Office Visit    6 months ago Routine health maintenance    Chema Fine MD    Office Visit    1 year ago Essential hypertension    [facility], [address], [place] Ochoa, JOHAN    Virtual Phone E/M    1 year ago Upper respiratory tract infection, unspecified type    Yesenia Miller MD    Office Visit    2 years ago Primary insomnia    Refugia Harman Pan MD    Office Visit

## 2021-10-13 ENCOUNTER — IMAGING SERVICES (OUTPATIENT)
Dept: CT IMAGING | Age: 51
End: 2021-10-13
Attending: PHYSICIAN ASSISTANT

## 2021-10-13 ENCOUNTER — IMAGING SERVICES (OUTPATIENT)
Dept: MRI IMAGING | Age: 51
End: 2021-10-13
Attending: PHYSICIAN ASSISTANT

## 2021-10-13 DIAGNOSIS — M54.50 LOWER BACK PAIN: ICD-10-CM

## 2021-10-13 DIAGNOSIS — M51.36 OTHER INTERVERTEBRAL DISC DEGENERATION, LUMBAR REGION: ICD-10-CM

## 2021-10-13 PROCEDURE — 72131 CT LUMBAR SPINE W/O DYE: CPT | Performed by: STUDENT IN AN ORGANIZED HEALTH CARE EDUCATION/TRAINING PROGRAM

## 2021-10-13 PROCEDURE — A9585 GADOBUTROL INJECTION: HCPCS | Performed by: STUDENT IN AN ORGANIZED HEALTH CARE EDUCATION/TRAINING PROGRAM

## 2021-10-13 PROCEDURE — 72158 MRI LUMBAR SPINE W/O & W/DYE: CPT | Performed by: STUDENT IN AN ORGANIZED HEALTH CARE EDUCATION/TRAINING PROGRAM

## 2021-10-13 RX ORDER — GADOBUTROL 604.72 MG/ML
7.5 INJECTION INTRAVENOUS ONCE
Status: COMPLETED | OUTPATIENT
Start: 2021-10-13 | End: 2021-10-13

## 2021-10-13 RX ADMIN — GADOBUTROL 7.5 ML: 604.72 INJECTION INTRAVENOUS at 16:01

## 2021-11-02 DIAGNOSIS — F51.01 PRIMARY INSOMNIA: ICD-10-CM

## 2021-11-02 RX ORDER — ZOLPIDEM TARTRATE 12.5 MG/1
TABLET, FILM COATED, EXTENDED RELEASE ORAL
Qty: 30 TABLET | Refills: 0 | Status: SHIPPED | OUTPATIENT
Start: 2021-11-02 | End: 2021-12-02

## 2021-12-02 DIAGNOSIS — F51.01 PRIMARY INSOMNIA: ICD-10-CM

## 2021-12-02 RX ORDER — ZOLPIDEM TARTRATE 12.5 MG/1
12.5 TABLET, FILM COATED, EXTENDED RELEASE ORAL NIGHTLY PRN
Qty: 30 TABLET | Refills: 0 | Status: SHIPPED | OUTPATIENT
Start: 2021-12-02 | End: 2022-01-02

## 2021-12-02 NOTE — TELEPHONE ENCOUNTER
Please review; protocol failed/no protocol    Requested Prescriptions   Pending Prescriptions Disp Refills    ZOLPIDEM TARTRATE ER 12.5 MG Oral Tab CR [Pharmacy Med Name: ZOLPIDEM ER 12.5MG TABLETS] 30 tablet 0     Sig: TAKE 1 TABLET(12.5 MG) BY MOUTH EVER

## 2021-12-31 DIAGNOSIS — F51.01 PRIMARY INSOMNIA: ICD-10-CM

## 2022-01-01 NOTE — TELEPHONE ENCOUNTER
I'm sorry for the mistake, but we cannot reimburse patient's for their prescriptions. Sometimes patients have different doses of sleeping pills that they prefer. Both doses were on record.    He can try taking 2 tablets at one time or he can get a short marroquin Nursing notes reviewed and accepted.    Ann Huston is a 4 month old female who presents for 4 month old well child exam.  Patient presents with Father and Mother.    Concerns raised today include: still  Spitting up  But but she seems happy and she is gaining weight  Feeding:  bottle  is adequate.  Sleeping:  No sleep or behavioral concerns.  Elimination: Normal wet diapers and bowel movements.    SOCIAL:  Primary caretakers:  Parents  Support at home:  Yes  Smoke exposure:  none    DEVELOPMENT:  holds head up to 90 degrees, laughs, follows past midline and no persistant fist clenching  Nursing notes were reviewed.    PAST HISTORIES:  Medical history, surgical history, and family history reviewed and updated.    BIRTH HISTORY:  No birth history on file.    REVIEW OF SYSTEMS:  Negative including Eyes, ENT, CV, Resp, GI, , MS, Skin, Neuro and see nurses note     PHYSICAL EXAM:  Pulse 130, temperature 97.4 °F (36.3 °C), temperature source Temporal, resp. rate 36, height 24\" (61 cm), weight 6.441 kg (14 lb 3.2 oz), head circumference 43 cm (16.93\").    GENERAL: Well appearing  female, nontoxic, no acute distress. Alert and interactive.  SKIN: Warm, normal turgor. No cyanosis. No bruises or lesions.  HEAD: Normocephalic, atraumatic. Anterior fontanelle open, soft, and flat.  EYES: Conjunctiva appear normal, non-injected, non-icteric.  NOSE: Appears normal, no flaring.  EARS: Normal pinnae, no preauricular skin tags or pit. Tympanic membranes are transparent with good landmarks.  THROAT: Oropharynx with moist mucus membranes and no lesions.  NECK: Supple, no lymphadenopathy or masses.  HEART: Regular rate and rhythm. Quiet precordium. Normal S1, S2. No murmurs, rubs, gallops.   LUNGS: Clear to auscultation bilaterally. No wheezes, rales, rhonchi. Normal work of breathing.  ABDOMEN: Soft, nontender. No organomegaly or masses.  GENITOURINARY: Rodri 1 female and No labial adhesions or lesions.  MUSCULOSKELETAL:  Hips within normal range of motion. Negative Salvador, Ortolani. Spine straight. Normal sacrum.  EXTREMITIES: Warm, dry, without abnormalities.  NEUROLOGICAL: Normal tone, bulk, strength    ASSESSMENT:  4 month old female well infant.  Encounter for routine child health examination without abnormal findings    Need for vaccination  - DTAP HEPB IPV COMBINED VACCINE IM (PEDIARIX)  - HIB VACC,PRP-OMP,IM(PEDVAXHIB)  - PNEUMOCOCCAL CONJUGATE 13 VALENT VACC(PREVNAR-13)  - ROTAVIRUS PENTAVALENT VACC 3 DOSE(ROTATEQ)    Gastroesophageal reflux in infants      PLAN:  The medication has been adjusted to increase further weight gain is recommended to continue keep the reflux precautions and good burping and to continue to monitor  All parental concerns and questions discussed.  Anticipatory guidance provided, handout given.  · Development  · SIDS prevention  · Accident prevention: scalding, falls, small toys, smothering  · No bottle in bed  · Analgesices/Antipyretics  · Sun Exposure  · Tobacco-free home  · Dental Care  · Lead exposure risk: None  · Vitamin D supplementation: not needed    Immunizations per orders. Risks, benefits, and side effects discussed.  Return to clinic for 6 month well child exam or sooner as needed for illness/concern    Gilda Germain MD

## 2022-01-02 RX ORDER — ZOLPIDEM TARTRATE 12.5 MG/1
12.5 TABLET, FILM COATED, EXTENDED RELEASE ORAL NIGHTLY PRN
Qty: 30 TABLET | Refills: 0 | Status: SHIPPED | OUTPATIENT
Start: 2022-01-02 | End: 2022-02-03

## 2022-01-29 DIAGNOSIS — K21.9 GASTROESOPHAGEAL REFLUX DISEASE WITHOUT ESOPHAGITIS: ICD-10-CM

## 2022-01-29 RX ORDER — OMEPRAZOLE 20 MG/1
CAPSULE, DELAYED RELEASE ORAL
Qty: 90 CAPSULE | Refills: 1 | Status: SHIPPED | OUTPATIENT
Start: 2022-01-29

## 2022-01-29 NOTE — TELEPHONE ENCOUNTER
Refill passed per [facility], [facility] protocol.    Requested Prescriptions   Pending Prescriptions Disp Refills    OMEPRAZOLE 20 MG Oral Capsule Delayed Release [Pharmacy Med Name: OMEPRAZOLE 20MG CAPSULES] 90 capsule 1     Sig: TAKE 1 CAPSULE BY MOUTH EVERY MO

## 2022-02-01 DIAGNOSIS — F51.01 PRIMARY INSOMNIA: ICD-10-CM

## 2022-02-01 RX ORDER — ZOLPIDEM TARTRATE 12.5 MG/1
12.5 TABLET, FILM COATED, EXTENDED RELEASE ORAL NIGHTLY PRN
Qty: 30 TABLET | Refills: 0 | OUTPATIENT
Start: 2022-02-01

## 2022-02-01 NOTE — TELEPHONE ENCOUNTER
Please review. Protocol Failed or has No Protocol.     Requested Prescriptions   Pending Prescriptions Disp Refills    ZOLPIDEM TARTRATE ER 12.5 MG Oral Tab CR [Pharmacy Med Name: ZOLPIDEM ER 12.5MG TABLETS] 30 tablet 0     Sig: TAKE 1 TABLET(12.5 MG) BY MOUTH EVERY NIGHT AS NEEDED        There is no refill protocol information for this order               Recent Outpatient Visits              7 months ago Diarrhea of presumed infectious origin    [facility], [facility], [address] [place], [place] [address]Ofelia MD    Office Visit    1 year ago Routine health maintenance    Walter Solis MD    Office Visit    1 year ago Essential hypertension    [facility], [facility], [place], Nichol Ochoa APRN    Virtual Phone E/M    2 years ago Upper respiratory tract infection, unspecified type    Koko Germain MD    Office Visit    2 years ago Primary insomnia    Jai Frazier MD    Office Visit

## 2022-02-03 ENCOUNTER — TELEMEDICINE (OUTPATIENT)
Dept: INTERNAL MEDICINE CLINIC | Facility: CLINIC | Age: 52
End: 2022-02-03

## 2022-02-03 DIAGNOSIS — I10 ESSENTIAL HYPERTENSION: Primary | ICD-10-CM

## 2022-02-03 DIAGNOSIS — F51.01 PRIMARY INSOMNIA: ICD-10-CM

## 2022-02-03 PROBLEM — J06.9 UPPER RESPIRATORY TRACT INFECTION: Status: RESOLVED | Noted: 2018-05-29 | Resolved: 2022-02-03

## 2022-02-03 PROCEDURE — 99214 OFFICE O/P EST MOD 30 MIN: CPT | Performed by: INTERNAL MEDICINE

## 2022-02-03 RX ORDER — LISINOPRIL AND HYDROCHLOROTHIAZIDE 20; 12.5 MG/1; MG/1
1 TABLET ORAL DAILY
Qty: 90 TABLET | Refills: 0 | Status: SHIPPED | OUTPATIENT
Start: 2022-02-03

## 2022-02-03 RX ORDER — ZOLPIDEM TARTRATE 12.5 MG/1
12.5 TABLET, FILM COATED, EXTENDED RELEASE ORAL NIGHTLY PRN
Qty: 90 TABLET | Refills: 0 | Status: SHIPPED | OUTPATIENT
Start: 2022-02-03

## 2022-02-03 NOTE — ASSESSMENT & PLAN NOTE
Pt cannot sleep without Ambien. He tried behavioral techniques. Controlled. Continue present management.

## 2022-04-12 NOTE — PROGRESS NOTES
HPI:    Patient ID: José Sr is a 54year old male. Pt has been sick for 5 days. His back is worse lately. He takes the tramadol, but it makes him nauseated. He will need his blood pressure meds refilled and he is due for blood tests.   Pt can If you are a smoker, it is important for your health to stop smoking. Please be aware that second hand smoke is also harmful. spasms.  Disp: 30 tablet Rfl: 1   Zolpidem Tartrate ER 12.5 MG Oral Tab CR TAKE 1 TABLET BY MOUTH EVERY NIGHT AT BEDTIME AS NEEDED FOR SLEEP Disp: 30 tablet Rfl: 5   omeprazole 20 MG Oral Capsule Delayed Release TAKE 1 CAPSULE BY MOUTH EVERY MORNING Disp: 9 Tympanic membrane and ear canal normal.   Left Ear: Tympanic membrane and ear canal normal.   Mouth/Throat: Oropharynx is clear and moist.   Eyes: Conjunctivae and EOM are normal.   Neck: Neck supple.    Cardiovascular: Normal rate, regular rhythm and meli Visit:  Requested Prescriptions     Signed Prescriptions Disp Refills   • Cyclobenzaprine HCl 10 MG Oral Tab 30 tablet 1     Sig: Take 1 tablet (10 mg total) by mouth nightly as needed for Muscle spasms.    • Zolpidem Tartrate ER 12.5 MG Oral Tab CR 30 tabl

## 2022-04-21 ENCOUNTER — OFFICE VISIT (OUTPATIENT)
Dept: INTERNAL MEDICINE CLINIC | Facility: CLINIC | Age: 52
End: 2022-04-21
Payer: COMMERCIAL

## 2022-04-21 VITALS
DIASTOLIC BLOOD PRESSURE: 75 MMHG | SYSTOLIC BLOOD PRESSURE: 113 MMHG | WEIGHT: 166.81 LBS | HEART RATE: 76 BPM | BODY MASS INDEX: 24.15 KG/M2 | TEMPERATURE: 98 F | HEIGHT: 69.5 IN

## 2022-04-21 DIAGNOSIS — I10 ESSENTIAL HYPERTENSION: ICD-10-CM

## 2022-04-21 DIAGNOSIS — M54.50 INTERMITTENT LOW BACK PAIN: ICD-10-CM

## 2022-04-21 DIAGNOSIS — H81.10 BENIGN PAROXYSMAL POSITIONAL VERTIGO, UNSPECIFIED LATERALITY: Primary | ICD-10-CM

## 2022-04-21 PROCEDURE — 99214 OFFICE O/P EST MOD 30 MIN: CPT | Performed by: INTERNAL MEDICINE

## 2022-04-21 PROCEDURE — 3078F DIAST BP <80 MM HG: CPT | Performed by: INTERNAL MEDICINE

## 2022-04-21 PROCEDURE — 3074F SYST BP LT 130 MM HG: CPT | Performed by: INTERNAL MEDICINE

## 2022-04-21 PROCEDURE — 3008F BODY MASS INDEX DOCD: CPT | Performed by: INTERNAL MEDICINE

## 2022-04-21 RX ORDER — LISINOPRIL AND HYDROCHLOROTHIAZIDE 20; 12.5 MG/1; MG/1
1 TABLET ORAL DAILY
Qty: 90 TABLET | Refills: 0 | Status: SHIPPED | OUTPATIENT
Start: 2022-04-21

## 2022-04-21 RX ORDER — MECLIZINE HCL 12.5 MG/1
TABLET ORAL
Qty: 30 TABLET | Refills: 2 | Status: SHIPPED | OUTPATIENT
Start: 2022-04-21

## 2022-05-06 RX ORDER — ZOLPIDEM TARTRATE 12.5 MG/1
TABLET, FILM COATED, EXTENDED RELEASE ORAL
Qty: 90 TABLET | Refills: 0 | Status: SHIPPED | OUTPATIENT
Start: 2022-05-06

## 2022-05-11 ENCOUNTER — TELEPHONE (OUTPATIENT)
Dept: PHYSICAL THERAPY | Facility: HOSPITAL | Age: 52
End: 2022-05-11

## 2022-05-18 ENCOUNTER — APPOINTMENT (OUTPATIENT)
Dept: PHYSICAL THERAPY | Age: 52
End: 2022-05-18
Attending: INTERNAL MEDICINE
Payer: COMMERCIAL

## 2022-05-25 ENCOUNTER — APPOINTMENT (OUTPATIENT)
Dept: PHYSICAL THERAPY | Age: 52
End: 2022-05-25
Attending: INTERNAL MEDICINE
Payer: COMMERCIAL

## 2022-06-02 ENCOUNTER — APPOINTMENT (OUTPATIENT)
Dept: PHYSICAL THERAPY | Age: 52
End: 2022-06-02
Attending: INTERNAL MEDICINE
Payer: COMMERCIAL

## 2022-06-07 ENCOUNTER — APPOINTMENT (OUTPATIENT)
Dept: PHYSICAL THERAPY | Age: 52
End: 2022-06-07
Attending: INTERNAL MEDICINE
Payer: COMMERCIAL

## 2022-06-14 ENCOUNTER — APPOINTMENT (OUTPATIENT)
Dept: PHYSICAL THERAPY | Age: 52
End: 2022-06-14
Attending: INTERNAL MEDICINE
Payer: COMMERCIAL

## 2022-06-21 ENCOUNTER — APPOINTMENT (OUTPATIENT)
Dept: PHYSICAL THERAPY | Age: 52
End: 2022-06-21
Attending: INTERNAL MEDICINE
Payer: COMMERCIAL

## 2022-06-28 ENCOUNTER — APPOINTMENT (OUTPATIENT)
Dept: PHYSICAL THERAPY | Age: 52
End: 2022-06-28
Attending: INTERNAL MEDICINE
Payer: COMMERCIAL

## 2022-07-05 ENCOUNTER — APPOINTMENT (OUTPATIENT)
Dept: PHYSICAL THERAPY | Age: 52
End: 2022-07-05
Attending: INTERNAL MEDICINE
Payer: COMMERCIAL

## 2022-07-12 ENCOUNTER — APPOINTMENT (OUTPATIENT)
Dept: PHYSICAL THERAPY | Age: 52
End: 2022-07-12
Attending: INTERNAL MEDICINE
Payer: COMMERCIAL

## 2022-07-18 DIAGNOSIS — K21.9 GASTROESOPHAGEAL REFLUX DISEASE WITHOUT ESOPHAGITIS: ICD-10-CM

## 2022-07-18 RX ORDER — OMEPRAZOLE 20 MG/1
CAPSULE, DELAYED RELEASE ORAL
Qty: 90 CAPSULE | Refills: 1 | Status: SHIPPED | OUTPATIENT
Start: 2022-07-18

## 2022-07-19 ENCOUNTER — APPOINTMENT (OUTPATIENT)
Dept: PHYSICAL THERAPY | Age: 52
End: 2022-07-19
Attending: INTERNAL MEDICINE
Payer: COMMERCIAL

## 2022-08-11 DIAGNOSIS — F51.01 PRIMARY INSOMNIA: ICD-10-CM

## 2022-08-11 RX ORDER — ZOLPIDEM TARTRATE 12.5 MG/1
TABLET, FILM COATED, EXTENDED RELEASE ORAL
Qty: 90 TABLET | Refills: 0 | Status: SHIPPED | OUTPATIENT
Start: 2022-08-11

## 2022-09-12 DIAGNOSIS — I10 ESSENTIAL HYPERTENSION: ICD-10-CM

## 2022-09-12 NOTE — TELEPHONE ENCOUNTER
Protocol failed or has No Protocol, please review  Requested Prescriptions   Pending Prescriptions Disp Refills    LISINOPRIL-HYDROCHLOROTHIAZIDE 20-12.5 MG Oral Tab [Pharmacy Med Name: LISINOPRIL-HCTZ 20/12.5MG TABLETS] 90 tablet 0     Sig: Take 1 tablet by mouth daily. Hypertensive Medications Protocol Failed - 9/12/2022  9:14 AM        Failed - CMP or BMP in past 6 months     No results found for this or any previous visit (from the past 4392 hour(s)).               Failed - GFR > 50     No results found for: EGFRCR              Passed - In person appointment in the past 12 or next 3 months       Recent Outpatient Visits              4 months ago Benign paroxysmal positional vertigo, unspecified laterality    [place] [facility], [address], Naz Huggins MD    Office Visit    7 months ago Essential hypertension    [facility], [address], Naz Huggins MD    Telemedicine    1 year ago Diarrhea of presumed infectious origin    [id number] [place], [place]    Office Visit    1 year ago Routine health maintenance    Linda Galloway MD    Office Visit    2 years ago Essential hypertension    [place] [facility], [address], Don, [place], JOHAN    Virtual Phone E/M                 Passed - Last BP reading less than 140/90     BP Readings from Last 1 Encounters:  04/21/22 : 113/75                Passed - In person appointment or virtual visit in the past 6 months       Recent Outpatient Visits              4 months ago Benign paroxysmal positional vertigo, unspecified laterality    Roxi Power MD    Office Visit    7 months ago Essential hypertension    Roxi Power MD    Telemedicine    1 year ago Diarrhea of presumed infectious origin    [address] [facility]    Office Visit    1 year ago Routine health maintenance    Mauricio Casiano MD    Office Visit    2 years ago Essential hypertension    [facility], [address], Israel Ochoa APRN    Virtual Phone E/M                       Recent Outpatient Visits              4 months ago Benign paroxysmal positional vertigo, unspecified laterality    [facility], [address], Rambo Kearns MD    Office Visit    7 months ago Essential hypertension    [facility], [address], Rambo Kearns MD    Telemedicine    1 year ago Diarrhea of presumed infectious origin    Emeka Seymour, Ofelia Horner MD    Office Visit    1 year ago Routine health maintenance    Mauricio Casiano MD    Office Visit    2 years ago Essential hypertension    [facility], [address], Israel Ochoa, JOHAN    Virtual Phone E/M

## 2022-09-21 DIAGNOSIS — I10 ESSENTIAL HYPERTENSION: ICD-10-CM

## 2022-09-21 RX ORDER — LISINOPRIL AND HYDROCHLOROTHIAZIDE 20; 12.5 MG/1; MG/1
1 TABLET ORAL DAILY
Qty: 90 TABLET | Refills: 1 | Status: SHIPPED | OUTPATIENT
Start: 2022-09-21

## 2022-09-21 RX ORDER — LISINOPRIL AND HYDROCHLOROTHIAZIDE 20; 12.5 MG/1; MG/1
1 TABLET ORAL DAILY
Qty: 90 TABLET | Refills: 0 | OUTPATIENT
Start: 2022-09-21

## 2022-11-17 DIAGNOSIS — F51.01 PRIMARY INSOMNIA: ICD-10-CM

## 2022-11-17 RX ORDER — ZOLPIDEM TARTRATE 12.5 MG/1
TABLET, FILM COATED, EXTENDED RELEASE ORAL
Qty: 90 TABLET | Refills: 0 | Status: SHIPPED | OUTPATIENT
Start: 2022-11-17

## 2022-12-29 DIAGNOSIS — K21.9 GASTROESOPHAGEAL REFLUX DISEASE WITHOUT ESOPHAGITIS: ICD-10-CM

## 2022-12-29 DIAGNOSIS — I10 ESSENTIAL HYPERTENSION: ICD-10-CM

## 2022-12-29 RX ORDER — LISINOPRIL AND HYDROCHLOROTHIAZIDE 20; 12.5 MG/1; MG/1
1 TABLET ORAL DAILY
Qty: 90 TABLET | Refills: 1 | Status: SHIPPED | OUTPATIENT
Start: 2022-12-29

## 2022-12-29 RX ORDER — OMEPRAZOLE 20 MG/1
CAPSULE, DELAYED RELEASE ORAL
Qty: 90 CAPSULE | Refills: 1 | Status: SHIPPED | OUTPATIENT
Start: 2022-12-29

## 2022-12-29 NOTE — TELEPHONE ENCOUNTER
Refill passed per  [place] protocol. Requested Prescriptions   Pending Prescriptions Disp Refills    OMEPRAZOLE 20 MG Oral Capsule Delayed Release [Pharmacy Med Name: OMEPRAZOLE 20MG CAPSULES] 90 capsule 1     Sig: TAKE 1 CAPSULE BY MOUTH EVERY MORNING       Gastrointestional Medication Protocol Passed - 2022  5:49 AM        Passed - In person appointment or virtual visit in the past 12 mos or appointment in next 3 mos     Recent Outpatient Visits              3 months ago Chronic pain of both knees    [facility], [address] [place], [place] [address]Nisha Hiawassee, MD    Telemedicine    8 months ago Benign paroxysmal positional vertigo, unspecified laterality    Chris Patel MD    Office Visit    10 months ago Essential hypertension    Chris Patel MD    Telemedicine    1 year ago Diarrhea of presumed infectious origin    Saundra Cedillo MD    Office Visit    1 year ago Routine health maintenance    Gino Matute MD    Office Visit                        LISINOPRIL-HYDROCHLOROTHIAZIDE 20-12.5 MG Oral Tab [Pharmacy Med Name: LISINOPRIL-HCTZ 20/12.5MG TABLETS] 90 tablet 1     Sig: Take 1 tablet by mouth daily. Hypertensive Medications Protocol Failed - 2022  5:49 AM        Failed - CMP or BMP in past 6 months     No results found for this or any previous visit (from the past 4392 hour(s)).             Failed - EGFRCR or GFRNAA > 50     GFR Evaluation            Passed - In person appointment in the past 12 or next 3 months     Recent Outpatient Visits              3 months ago Chronic pain of both knees    [facility], [address] [place], [place] [address]Nette MD    Telemedicine    8 months ago Benign paroxysmal positional vertigo, unspecified laterality    Chris Patel MD    Office Visit    10 months ago Essential hypertension    [address], [address], [facility], Susan Bella MD    Telemedicine    1 year ago Diarrhea of presumed infectious origin    [address], Morales FormOfelia MD    Office Visit    1 year ago Routine health maintenance    [address], Namita Albarado MD    Office Visit                      Passed - Last BP reading less than 140/90     BP Readings from Last 1 Encounters:  04/21/22 : 113/75              Passed - In person appointment or virtual visit in the past 6 months     Recent Outpatient Visits              3 months ago Chronic pain of both knees    [facility], [address] [place] [address], Susan Bella MD    Telemedicine    8 months ago Benign paroxysmal positional vertigo, unspecified laterality    [address], [address], Lotus Ahumada MD    Office Visit    10 months ago Essential hypertension    [address], [address], Lotus Ahumada MD    Telemedicine    1 year ago Diarrhea of presumed infectious origin    [address], Morales FormOfelia MD    Office Visit    1 year ago Routine health maintenance    [address], Namita Albarado MD    Office Visit                         Recent Outpatient Visits              3 months ago Chronic pain of both knees    [address], Namita Albarado MD    Telemedicine    8 months ago Benign paroxysmal positional vertigo, unspecified laterality    [address], [address], Lotus Ahumada MD    Office Visit    10 months ago Essential hypertension    Kyle Delacruz MD    Telemedicine    1 year ago Diarrhea of presumed infectious origin    [address], Toño Darnell MD    Office Visit    1 year ago Routine health maintenance    [address], [address] [facility], Namita Albarado MD    Office Visit

## 2022-12-29 NOTE — TELEPHONE ENCOUNTER
Protocol failed or has No Protocol, please review    Routing as Dr. Sarah Barrientos  is out of office     Requested Prescriptions   Pending Prescriptions Disp Refills    LISINOPRIL-HYDROCHLOROTHIAZIDE 20-12.5 MG Oral Tab [Pharmacy Med Name: LISINOPRIL-HCTZ 20/12.5MG TABLETS] 90 tablet 1     Sig: TAKE 1 TABLET BY MOUTH DAILY       Hypertensive Medications Protocol Failed - 12/29/2022  5:49 AM        Failed - CMP or BMP in past 6 months     No results found for this or any previous visit (from the past 4392 hour(s)).             Failed - EGFRCR or GFRNAA > 50     GFR Evaluation            Passed - In person appointment in the past 12 or next 3 months     Recent Outpatient Visits              3 months ago Chronic pain of both knees    [facility], [address], Davin Bull MD    Telemedicine    8 months ago Benign paroxysmal positional vertigo, unspecified laterality    [address] [facility], Prerna Honeycutt MD    Office Visit    10 months ago Essential hypertension    [facility], [address], Prerna Honeycutt MD    Telemedicine    1 year ago Diarrhea of presumed infectious origin    [address] [facility] [place]    Office Visit    1 year ago Routine health maintenance    [address] [facility]    Office Visit                      Passed - Last BP reading less than 140/90     BP Readings from Last 1 Encounters:  04/21/22 : 113/75              Passed - In person appointment or virtual visit in the past 6 months     Recent Outpatient Visits              3 months ago Chronic pain of both knees    [facility], [address], Davin Bull MD    Telemedicine    8 months ago Benign paroxysmal positional vertigo, unspecified laterality    Larisa Adler MD    Office Visit    10 months ago Essential hypertension    [address] [facility], Prerna Honeycutt MD Telemedicine    1 year ago Diarrhea of presumed infectious origin    [address] [facility], [address] Air[address] Ho[address] [place], MD    Office Visit    1 year ago Routine health maintenance    [facility],  [address], Claudette Bott, MD    Office Visit                       Signed Prescriptions Disp Refills    OMEPRAZOLE 20 MG Oral Capsule Delayed Release 90 capsule 1     Sig: TAKE 1 CAPSULE BY MOUTH EVERY MORNING       Gastrointestional Medication Protocol Passed - 12/29/2022  5:49 AM        Passed - In person appointment or virtual visit in the past 12 mos or appointment in next 3 mos     Recent Outpatient Visits              3 months ago Chronic pain of both knees    [facility], [facility]    Telemedicine    8 months ago Benign paroxysmal positional vertigo, unspecified laterality    [facility], [address], Shad Mariee MD    Office Visit    10 months ago Essential hypertension    [facility], [address], Shad Mariee MD    Telemedicine    1 year ago Diarrhea of presumed infectious origin    [address] [facility], Ofelia Gandhi MD    Office Visit    1 year ago Routine health maintenance    [address] [facility], Claudette Bott, MD    Office Visit                           Recent Outpatient Visits              3 months ago Chronic pain of both knees    [address] [facility]    Telemedicine    8 months ago Benign paroxysmal positional vertigo, unspecified laterality    Louise Leung MD    Office Visit    10 months ago Essential hypertension    Louise Leung MD    Telemedicine    1 year ago Diarrhea of presumed infectious origin    [address] [facility], Elizabeth Jensen MD    Office Visit    1 year ago Routine health maintenance    [address] [facility], Claudette Bott, MD Office Visit

## 2023-02-19 DIAGNOSIS — F51.01 PRIMARY INSOMNIA: ICD-10-CM

## 2023-02-20 RX ORDER — ZOLPIDEM TARTRATE 12.5 MG/1
12.5 TABLET, FILM COATED, EXTENDED RELEASE ORAL NIGHTLY PRN
Qty: 30 TABLET | Refills: 0 | Status: SHIPPED | OUTPATIENT
Start: 2023-02-20

## 2023-02-20 NOTE — TELEPHONE ENCOUNTER
Please review refill protocol failed/ no protocol  Requested Prescriptions   Pending Prescriptions Disp Refills    ZOLPIDEM TARTRATE ER 12.5 MG Oral Tab CR [Pharmacy Med Name: ZOLPIDEM ER 12.5MG TABLETS] 90 tablet 0     Sig: TAKE 1 TABLET(12.5 MG) BY MOUTH EVERY NIGHT AS NEEDED       There is no refill protocol information for this order

## 2023-03-09 ENCOUNTER — LAB ENCOUNTER (OUTPATIENT)
Dept: LAB | Facility: REFERENCE LAB | Age: 53
End: 2023-03-09
Attending: INTERNAL MEDICINE
Payer: COMMERCIAL

## 2023-03-09 ENCOUNTER — OFFICE VISIT (OUTPATIENT)
Dept: INTERNAL MEDICINE CLINIC | Facility: CLINIC | Age: 53
End: 2023-03-09

## 2023-03-09 VITALS
DIASTOLIC BLOOD PRESSURE: 77 MMHG | WEIGHT: 165 LBS | OXYGEN SATURATION: 99 % | RESPIRATION RATE: 16 BRPM | HEART RATE: 73 BPM | BODY MASS INDEX: 23.89 KG/M2 | SYSTOLIC BLOOD PRESSURE: 119 MMHG | HEIGHT: 69.5 IN

## 2023-03-09 DIAGNOSIS — Z00.00 ROUTINE HEALTH MAINTENANCE: ICD-10-CM

## 2023-03-09 DIAGNOSIS — M79.10 MYALGIA: ICD-10-CM

## 2023-03-09 DIAGNOSIS — M25.50 ARTHRALGIA, UNSPECIFIED JOINT: ICD-10-CM

## 2023-03-09 DIAGNOSIS — I10 ESSENTIAL HYPERTENSION: ICD-10-CM

## 2023-03-09 DIAGNOSIS — M79.10 MYALGIA: Primary | ICD-10-CM

## 2023-03-09 DIAGNOSIS — M54.50 INTERMITTENT LOW BACK PAIN: ICD-10-CM

## 2023-03-09 PROBLEM — M94.0 COSTOCHONDRITIS, ACUTE: Status: RESOLVED | Noted: 2018-10-04 | Resolved: 2023-03-09

## 2023-03-09 LAB
ALBUMIN SERPL-MCNC: 4 G/DL (ref 3.4–5)
ALBUMIN/GLOB SERPL: 1.1 {RATIO} (ref 1–2)
ALP LIVER SERPL-CCNC: 73 U/L
ALT SERPL-CCNC: 26 U/L
ANION GAP SERPL CALC-SCNC: 9 MMOL/L (ref 0–18)
AST SERPL-CCNC: 14 U/L (ref 15–37)
BASOPHILS # BLD AUTO: 0.04 X10(3) UL (ref 0–0.2)
BASOPHILS NFR BLD AUTO: 0.3 %
BILIRUB SERPL-MCNC: 0.6 MG/DL (ref 0.1–2)
BUN BLD-MCNC: 12 MG/DL (ref 7–18)
BUN/CREAT SERPL: 12 (ref 10–20)
CALCIUM BLD-MCNC: 9.5 MG/DL (ref 8.5–10.1)
CHLORIDE SERPL-SCNC: 103 MMOL/L (ref 98–112)
CHOLEST SERPL-MCNC: 190 MG/DL (ref ?–200)
CO2 SERPL-SCNC: 26 MMOL/L (ref 21–32)
COMPLEXED PSA SERPL-MCNC: 2.22 NG/ML (ref ?–4)
CREAT BLD-MCNC: 1 MG/DL
CRP SERPL-MCNC: 0.3 MG/DL (ref ?–0.3)
DEPRECATED RDW RBC AUTO: 40.7 FL (ref 35.1–46.3)
EOSINOPHIL # BLD AUTO: 0.08 X10(3) UL (ref 0–0.7)
EOSINOPHIL NFR BLD AUTO: 0.6 %
ERYTHROCYTE [DISTWIDTH] IN BLOOD BY AUTOMATED COUNT: 12.2 % (ref 11–15)
ERYTHROCYTE [SEDIMENTATION RATE] IN BLOOD: 25 MM/HR
FASTING PATIENT LIPID ANSWER: YES
FASTING STATUS PATIENT QL REPORTED: YES
FOLATE SERPL-MCNC: 15.4 NG/ML (ref 8.7–?)
GFR SERPLBLD BASED ON 1.73 SQ M-ARVRAT: 92 ML/MIN/1.73M2 (ref 60–?)
GLOBULIN PLAS-MCNC: 3.8 G/DL (ref 2.8–4.4)
GLUCOSE BLD-MCNC: 99 MG/DL (ref 70–99)
HCT VFR BLD AUTO: 44.3 %
HDLC SERPL-MCNC: 54 MG/DL (ref 40–59)
HGB BLD-MCNC: 15 G/DL
IMM GRANULOCYTES # BLD AUTO: 0.03 X10(3) UL (ref 0–1)
IMM GRANULOCYTES NFR BLD: 0.2 %
LDLC SERPL CALC-MCNC: 120 MG/DL (ref ?–100)
LYMPHOCYTES # BLD AUTO: 2.21 X10(3) UL (ref 1–4)
LYMPHOCYTES NFR BLD AUTO: 15.8 %
MCH RBC QN AUTO: 30.9 PG (ref 26–34)
MCHC RBC AUTO-ENTMCNC: 33.9 G/DL (ref 31–37)
MCV RBC AUTO: 91.2 FL
MONOCYTES # BLD AUTO: 1.17 X10(3) UL (ref 0.1–1)
MONOCYTES NFR BLD AUTO: 8.4 %
NEUTROPHILS # BLD AUTO: 10.42 X10 (3) UL (ref 1.5–7.7)
NEUTROPHILS # BLD AUTO: 10.42 X10(3) UL (ref 1.5–7.7)
NEUTROPHILS NFR BLD AUTO: 74.7 %
NONHDLC SERPL-MCNC: 136 MG/DL (ref ?–130)
OSMOLALITY SERPL CALC.SUM OF ELEC: 286 MOSM/KG (ref 275–295)
PLATELET # BLD AUTO: 330 10(3)UL (ref 150–450)
POTASSIUM SERPL-SCNC: 4.1 MMOL/L (ref 3.5–5.1)
PROT SERPL-MCNC: 7.8 G/DL (ref 6.4–8.2)
RBC # BLD AUTO: 4.86 X10(6)UL
SODIUM SERPL-SCNC: 138 MMOL/L (ref 136–145)
TRIGL SERPL-MCNC: 88 MG/DL (ref 30–149)
TSI SER-ACNC: 0.77 MIU/ML (ref 0.36–3.74)
VIT B12 SERPL-MCNC: 466 PG/ML (ref 193–986)
VIT D+METAB SERPL-MCNC: 14.7 NG/ML (ref 30–100)
VLDLC SERPL CALC-MCNC: 15 MG/DL (ref 0–30)
WBC # BLD AUTO: 14 X10(3) UL (ref 4–11)

## 2023-03-09 PROCEDURE — 99214 OFFICE O/P EST MOD 30 MIN: CPT | Performed by: INTERNAL MEDICINE

## 2023-03-09 PROCEDURE — 36415 COLL VENOUS BLD VENIPUNCTURE: CPT

## 2023-03-09 PROCEDURE — 82746 ASSAY OF FOLIC ACID SERUM: CPT

## 2023-03-09 PROCEDURE — 82607 VITAMIN B-12: CPT

## 2023-03-09 PROCEDURE — 80061 LIPID PANEL: CPT

## 2023-03-09 PROCEDURE — 80053 COMPREHEN METABOLIC PANEL: CPT

## 2023-03-09 PROCEDURE — 85652 RBC SED RATE AUTOMATED: CPT

## 2023-03-09 PROCEDURE — 86140 C-REACTIVE PROTEIN: CPT

## 2023-03-09 PROCEDURE — 86431 RHEUMATOID FACTOR QUANT: CPT

## 2023-03-09 PROCEDURE — 85025 COMPLETE CBC W/AUTO DIFF WBC: CPT

## 2023-03-09 PROCEDURE — 3008F BODY MASS INDEX DOCD: CPT | Performed by: INTERNAL MEDICINE

## 2023-03-09 PROCEDURE — 3074F SYST BP LT 130 MM HG: CPT | Performed by: INTERNAL MEDICINE

## 2023-03-09 PROCEDURE — 3078F DIAST BP <80 MM HG: CPT | Performed by: INTERNAL MEDICINE

## 2023-03-09 PROCEDURE — 86200 CCP ANTIBODY: CPT

## 2023-03-09 PROCEDURE — 82306 VITAMIN D 25 HYDROXY: CPT

## 2023-03-09 PROCEDURE — 84443 ASSAY THYROID STIM HORMONE: CPT

## 2023-03-09 RX ORDER — CYCLOBENZAPRINE HCL 5 MG
5 TABLET ORAL EVERY 8 HOURS PRN
COMMUNITY
Start: 2022-09-30 | End: 2023-03-09

## 2023-03-09 RX ORDER — CYCLOBENZAPRINE HCL 5 MG
TABLET ORAL EVERY 8 HOURS PRN
Qty: 90 TABLET | Refills: 1 | Status: SHIPPED | OUTPATIENT
Start: 2023-03-09

## 2023-03-09 RX ORDER — KETOCONAZOLE 20 MG/ML
SHAMPOO TOPICAL
COMMUNITY
Start: 2022-12-13

## 2023-03-09 RX ORDER — HYDROCODONE BITARTRATE AND ACETAMINOPHEN 5; 325 MG/1; MG/1
1 TABLET ORAL AS NEEDED
COMMUNITY
Start: 2022-12-12

## 2023-03-10 LAB
CCP IGG SERPL-ACNC: 0.9 U/ML (ref 0–6.9)
RHEUMATOID FACT SERPL-ACNC: <10 IU/ML (ref ?–15)

## 2023-03-21 DIAGNOSIS — F51.01 PRIMARY INSOMNIA: ICD-10-CM

## 2023-03-22 RX ORDER — ZOLPIDEM TARTRATE 12.5 MG/1
12.5 TABLET, FILM COATED, EXTENDED RELEASE ORAL NIGHTLY PRN
Qty: 30 TABLET | Refills: 2 | Status: SHIPPED | OUTPATIENT
Start: 2023-03-22 | End: 2023-03-23

## 2023-03-22 NOTE — TELEPHONE ENCOUNTER
Please review refill protocol failed/ no protocol  Requested Prescriptions   Pending Prescriptions Disp Refills    ZOLPIDEM TARTRATE ER 12.5 MG Oral Tab CR [Pharmacy Med Name: ZOLPIDEM ER 12.5MG TABLETS] 30 tablet 0     Sig: TAKE 1 TABLET(12.5 MG) BY MOUTH EVERY NIGHT AS NEEDED       There is no refill protocol information for this order

## 2023-03-23 ENCOUNTER — TELEMEDICINE (OUTPATIENT)
Dept: INTERNAL MEDICINE CLINIC | Facility: CLINIC | Age: 53
End: 2023-03-23

## 2023-03-23 DIAGNOSIS — M79.10 MYALGIA: Primary | ICD-10-CM

## 2023-03-23 DIAGNOSIS — E55.9 VITAMIN D DEFICIENCY: ICD-10-CM

## 2023-03-23 DIAGNOSIS — N63.14 MASS OF LOWER INNER QUADRANT OF RIGHT BREAST: ICD-10-CM

## 2023-03-23 DIAGNOSIS — F51.01 PRIMARY INSOMNIA: ICD-10-CM

## 2023-03-23 PROCEDURE — 99214 OFFICE O/P EST MOD 30 MIN: CPT | Performed by: INTERNAL MEDICINE

## 2023-03-23 RX ORDER — ERGOCALCIFEROL 1.25 MG/1
50000 CAPSULE ORAL WEEKLY
Qty: 4 CAPSULE | Refills: 0 | Status: SHIPPED | OUTPATIENT
Start: 2023-03-23 | End: 2023-04-22

## 2023-03-23 RX ORDER — ZOLPIDEM TARTRATE 12.5 MG/1
12.5 TABLET, FILM COATED, EXTENDED RELEASE ORAL NIGHTLY PRN
Qty: 30 TABLET | Refills: 5 | Status: SHIPPED | OUTPATIENT
Start: 2023-03-23

## 2023-03-23 NOTE — ASSESSMENT & PLAN NOTE
Reviewed labs with pt. ESR and CRP minimally elevated. RF neg. Vit d low and might be the cause of his aches. Start vit D. F/u in a few months if not better.

## 2023-04-03 ENCOUNTER — TELEPHONE (OUTPATIENT)
Dept: INTERNAL MEDICINE CLINIC | Facility: CLINIC | Age: 53
End: 2023-04-03

## 2023-04-03 DIAGNOSIS — N63.14 MASS OF LOWER INNER QUADRANT OF RIGHT BREAST: Primary | ICD-10-CM

## 2023-04-03 NOTE — TELEPHONE ENCOUNTER
Spoke to Ryley from Mammography. Patient is scheduled for a right side diagnostic mammogram but Ryley said the order should be for bilateral diagnostic mammogram.     Dr. Gorge Javed, please advise. Pended bilateral diagnostic mammogram as requested by mammography department.

## 2023-04-06 ENCOUNTER — HOSPITAL ENCOUNTER (OUTPATIENT)
Dept: MAMMOGRAPHY | Facility: HOSPITAL | Age: 53
Discharge: HOME | End: 2023-04-06
Attending: INTERNAL MEDICINE
Payer: COMMERCIAL

## 2023-04-06 ENCOUNTER — HOSPITAL ENCOUNTER (OUTPATIENT)
Dept: ULTRASOUND IMAGING | Facility: HOSPITAL | Age: 53
Discharge: HOME | End: 2023-04-06
Attending: INTERNAL MEDICINE
Payer: COMMERCIAL

## 2023-04-06 DIAGNOSIS — N63.14 MASS OF LOWER INNER QUADRANT OF RIGHT BREAST: ICD-10-CM

## 2023-04-06 PROCEDURE — 77062 BREAST TOMOSYNTHESIS BI: CPT | Performed by: INTERNAL MEDICINE

## 2023-04-06 PROCEDURE — 76642 ULTRASOUND BREAST LIMITED: CPT | Performed by: INTERNAL MEDICINE

## 2023-04-06 PROCEDURE — 77066 DX MAMMO INCL CAD BI: CPT | Performed by: INTERNAL MEDICINE

## 2023-08-26 DIAGNOSIS — I10 ESSENTIAL HYPERTENSION: ICD-10-CM

## 2023-08-26 NOTE — TELEPHONE ENCOUNTER
Refill request from [facility] for:      lisinopril-hydroCHLOROthiazide 20-12.5 MG Oral Tab, Take 1 tablet by mouth daily. , Disp: 90 tablet, Rfl: 1

## 2023-08-28 RX ORDER — LISINOPRIL AND HYDROCHLOROTHIAZIDE 20; 12.5 MG/1; MG/1
1 TABLET ORAL DAILY
Qty: 90 TABLET | Refills: 3 | Status: SHIPPED | OUTPATIENT
Start: 2023-08-28

## 2023-08-28 NOTE — TELEPHONE ENCOUNTER
Refill passed per [facility], [facility] protocol. Requested Prescriptions   Pending Prescriptions Disp Refills    lisinopril-hydroCHLOROthiazide 20-12.5 MG Oral Tab 90 tablet 1     Sig: Take 1 tablet by mouth daily.        Hypertensive Medications Protocol Passed - 8/28/2023  8:46 AM        Passed - In person appointment in the past 12 or next 3 months     Recent Outpatient Visits              5 months ago Mario [address] [place] [address] Peng Noble MD    Telemedicine    5 months ago Mario [address] [place] [address] Peng Noble MD    Office Visit    11 months ago [facility] pain of both knees    [facility]    [facility]    1 year ago Benign paroxysmal positional vertigo, unspecified laterality    [facility], [address] [facility], [facility] Peng Arce MD    Office Visit    1 year ago Essential hypertension    [facility], [address] [facility],[address], [facility] Peng Arce MD    Telemedicine                      Passed - Last BP reading less than 140/90     BP Readings from Last 1 Encounters:  03/09/23 : 119/77              Passed - CMP or BMP in past 6 months     Recent Results (from the past 4392 hour(s))   COMP METABOLIC PANEL (14)    Collection Time: 03/09/23  2:12 PM   Result Value Ref Range    Glucose 99 70 - 99 mg/dL    Sodium 138 136 - 145 mmol/L    Potassium 4.1 3.5 - 5.1 mmol/L    Chloride 103 98 - 112 mmol/L    CO2 26.0 21.0 - 32.0 mmol/L    Anion Gap 9 0 - 18 mmol/L    BUN 12 7 - 18 mg/dL    Creatinine 1.00 0.70 - 1.30 mg/dL    BUN/CREA Ratio 12.0 10.0 - 20.0    Calcium, Total 9.5 8.5 - 10.1 mg/dL    Calculated Osmolality 286 275 - 295 mOsm/kg    eGFR-Cr 92 >=60 mL/min/1.73m2    ALT 26 16 - 61 U/L    AST 14 (L) 15 - 37 U/L    Alkaline Phosphatase 73 45 - 117 U/L    Bilirubin, Total 0.6 0.1 - 2.0 mg/dL    Total Protein 7.8 6.4 - 8.2 g/dL    Albumin 4.0 3.4 - 5.0 g/dL Globulin  3.8 2.8 - 4.4 g/dL    A/G Ratio 1.1 1.0 - 2.0    Patient Fasting for CMP? Yes      *Note: Due to a large number of results and/or encounters for the requested time period, some results have not been displayed. A complete set of results can be found in Results Review.                Passed - In person appointment or virtual visit in the past 6 months     Recent Outpatient Visits              5 months ago [address], Meryl Garza MD    Telemedicine    5 months ago [address], [address] [facility], Meryl Garza MD    Office Visit    11 months ago Chronic pain of both knees    [facility], Meryl Garza MD    Telemedicine    1 year ago Benign paroxysmal positional vertigo, unspecified laterality    [address], [address] [facility]    Office Visit    1 year ago Essential hypertension    [facility], [address] [facility] Meryl Arce MD    Telemedicine                      Passed - EGFRCR or GFRNAA > 50     GFR Evaluation  EGFRCR: 92 , resulted on 3/9/2023             Recent Outpatient Visits              5 months ago Mario , [facility], Meryl Garza MD    Telemedicine    5 months ago [address], [facility], Trev Leggett MD    Office Visit    11 months ago Chronic pain of both knees    [address], [facility], Meryl Garza MD    Telemedicine    1 year ago Benign paroxysmal positional vertigo, unspecified laterality    [address], [address], Suzanne Lamar MD    Office Visit    1 year ago Essential hypertension    [address] [facility], Suzanne Lamar MD    Telemedicine

## 2023-09-21 DIAGNOSIS — F51.01 PRIMARY INSOMNIA: ICD-10-CM

## 2023-09-21 RX ORDER — ZOLPIDEM TARTRATE 12.5 MG/1
12.5 TABLET, FILM COATED, EXTENDED RELEASE ORAL NIGHTLY PRN
Qty: 30 TABLET | Refills: 5 | Status: SHIPPED | OUTPATIENT
Start: 2023-09-21

## 2023-09-21 RX ORDER — ZOLPIDEM TARTRATE 12.5 MG/1
12.5 TABLET, FILM COATED, EXTENDED RELEASE ORAL NIGHTLY PRN
Qty: 30 TABLET | Refills: 5 | Status: SHIPPED
Start: 2023-09-21 | End: 2023-09-21

## 2023-09-21 NOTE — TELEPHONE ENCOUNTER
Please review. Protocol failed / Has no protocol.      Requested Prescriptions   Pending Prescriptions Disp Refills    ZOLPIDEM TARTRATE ER 12.5 MG Oral Tab CR [Pharmacy Med Name: ZOLPIDEM ER 12.5MG TABLETS] 30 tablet 0     Sig: TAKE 1 TABLET(12.5 MG) BY MOUTH EVERY NIGHT AS NEEDED       There is no refill protocol information for this order           Recent Outpatient Visits              6 months ago [address], Adrianna Nelson MD    Telemedicine    6 months ago [address], [facility]    Office Visit    1 year ago [facility] pain of both knees    [facility]    Telemedicine    1 year ago Benign paroxysmal positional vertigo, unspecified laterality    Jerald Dwyer MD    Office Visit    1 year ago Essential hypertension    Leonor Avendano, [place] Ivan MD    Telemedicine

## 2023-10-12 ENCOUNTER — TELEPHONE (OUTPATIENT)
Facility: CLINIC | Age: 53
End: 2023-10-12

## 2023-10-12 NOTE — TELEPHONE ENCOUNTER
----- Message from Grzegorz Mixon, [address] sent at 12/13/2018  6:11 PM CST -----  Regarding: Recall Colon  Recall colon for 5 years per MG.  Colon done 12/12/18

## 2024-01-25 NOTE — TELEPHONE ENCOUNTER
Advised patient on Dr. Becky Cox information and recommendations. Gave patient the orthopedist's phone #. Patient verbalized understanding.  He said he will call the orthopedist. 19-Nov-2023

## 2024-03-26 DIAGNOSIS — F51.01 PRIMARY INSOMNIA: ICD-10-CM

## 2024-03-26 NOTE — TELEPHONE ENCOUNTER
Current Outpatient Medications:     Zolpidem Tartrate ER 12.5 MG Oral Tab CR, Take 1 tablet (12.5 mg total) by mouth nightly as needed., Disp: 30 tablet, Rfl: 5      This current prescription for your patient has no refills remaining or is about to . If you want to continue therapy for your patient as indicated above, please prepare and fax valid signed written prescription to the pharmacy in the space below or authorize a verbal prescription directly to the pharmacist.

## 2024-03-28 ENCOUNTER — OFFICE VISIT (OUTPATIENT)
Dept: INTERNAL MEDICINE CLINIC | Facility: CLINIC | Age: 54
End: 2024-03-28

## 2024-03-28 VITALS
SYSTOLIC BLOOD PRESSURE: 128 MMHG | WEIGHT: 169 LBS | HEART RATE: 77 BPM | DIASTOLIC BLOOD PRESSURE: 85 MMHG | BODY MASS INDEX: 24.47 KG/M2 | RESPIRATION RATE: 18 BRPM | HEIGHT: 69.5 IN

## 2024-03-28 DIAGNOSIS — M79.10 MYALGIA: ICD-10-CM

## 2024-03-28 DIAGNOSIS — I10 ESSENTIAL HYPERTENSION: ICD-10-CM

## 2024-03-28 DIAGNOSIS — F51.01 PRIMARY INSOMNIA: ICD-10-CM

## 2024-03-28 DIAGNOSIS — H66.001 NON-RECURRENT ACUTE SUPPURATIVE OTITIS MEDIA OF RIGHT EAR WITHOUT SPONTANEOUS RUPTURE OF TYMPANIC MEMBRANE: Primary | ICD-10-CM

## 2024-03-28 RX ORDER — AMOXICILLIN AND CLAVULANATE POTASSIUM 875; 125 MG/1; MG/1
1 TABLET, FILM COATED ORAL 2 TIMES DAILY
Qty: 20 TABLET | Refills: 0 | Status: SHIPPED | OUTPATIENT
Start: 2024-03-28 | End: 2024-04-07

## 2024-03-28 RX ORDER — CYCLOBENZAPRINE HCL 5 MG
TABLET ORAL EVERY 8 HOURS PRN
Qty: 90 TABLET | Refills: 1 | Status: SHIPPED | OUTPATIENT
Start: 2024-03-28

## 2024-03-28 RX ORDER — ZOLPIDEM TARTRATE 12.5 MG/1
12.5 TABLET, FILM COATED, EXTENDED RELEASE ORAL NIGHTLY PRN
Qty: 30 TABLET | Refills: 5 | Status: SHIPPED | OUTPATIENT
Start: 2024-03-28

## 2024-03-28 NOTE — PROGRESS NOTES
Bernard Frazier is a 53 year old male.  Chief Complaint   Patient presents with    Follow - Up     Medication RF.  Feels fluid in ears     HPI:    Patient presented today for medication refill. He states that over the weekend he had episode of dizziness and ear congestion. Was seen at the  near his house and was given prednisone and meclizine for fluid in his ears. Symptoms are a little better but still Has dizziness  Was told has fluid in the ears and was given meclizine.   Needs his blood pressure medication refilled. No other complains.       Current Outpatient Medications   Medication Sig Dispense Refill    cyclobenzaprine 5 MG Oral Tab Take 1-2 tablets (5-10 mg total) by mouth every 8 (eight) hours as needed. 90 tablet 1    amoxicillin clavulanate 875-125 MG Oral Tab Take 1 tablet by mouth 2 (two) times daily for 10 days. 20 tablet 0    lisinopril-hydroCHLOROthiazide 20-12.5 MG Oral Tab Take 1 tablet by mouth daily. 90 tablet 3    omeprazole 20 MG Oral Capsule Delayed Release TAKE 1 CAPSULE BY MOUTH EVERY MORNING 90 capsule 3    HYDROcodone-acetaminophen 5-325 MG Oral Tab Take 1 tablet by mouth as needed.      meclizine 12.5 MG Oral Tab 1-2 tabs tid prn dizziness.  No driving 8 hours after taking this-causes drowsiness. 30 tablet 2    Zolpidem Tartrate ER 12.5 MG Oral Tab CR Take 1 tablet (12.5 mg total) by mouth nightly as needed. 30 tablet 5    ketoconazole 2 % External Shampoo Apply topically 3 (three) times a week. (Patient not taking: Reported on 3/28/2024)        Past Medical History:   Diagnosis Date    Colon adenoma 12/12/2018    Degenerative joint disease (DJD) of lumbar spine     Esophageal reflux     Essential hypertension     Kidney stones     Varicose veins of both lower extremities       Past Surgical History:   Procedure Laterality Date    COLONOSCOPY  12/12/2018    EGD  12/12/2018    KNEE ARTHROSCOPY Left 1988    LAMINECTOMY,LUMBAR  2011    REPAIR OF NASAL SEPTUM  2009    VEIN LIGATION  AND STRIPPING Bilateral 2002      Social History:  Social History     Socioeconomic History    Marital status: Single    Number of children: 3   Occupational History    Occupation:    Tobacco Use    Smoking status: Never    Smokeless tobacco: Never   Vaping Use    Vaping Use: Never used    Passive vaping exposure: Yes   Substance and Sexual Activity    Alcohol use: Yes     Alcohol/week: 4.0 standard drinks of alcohol     Types: 4 Standard drinks or equivalent per week     Comment: occasionally    Drug use: No    Sexual activity: Yes     Partners: Female     Birth control/protection: Condom      Family History   Problem Relation Age of Onset    Diabetes Father     Heart Disorder Father     Heart Attack Father     Cancer Mother         uterine    Heart Attack Sister         x2      Allergies   Allergen Reactions    Levaquin [Levofloxacin] RASH        REVIEW OF SYSTEMS:   Review of Systems   Review of Systems   Constitutional: Negative for activity change, appetite change and fever.   HENT: Negative for congestion and voice change.    Respiratory: Negative for cough and shortness of breath.    Cardiovascular: Negative for chest pain.   Gastrointestinal: Negative for abdominal distention, abdominal pain and vomiting.   Genitourinary: Negative for hematuria.   Skin: Negative for wound.   Psychiatric/Behavioral: Negative for behavioral problems.   Wt Readings from Last 5 Encounters:   03/28/24 169 lb (76.7 kg)   03/09/23 165 lb (74.8 kg)   04/21/22 166 lb 12.8 oz (75.7 kg)   06/29/21 171 lb (77.6 kg)   02/01/21 169 lb 14.4 oz (77.1 kg)     Body mass index is 24.6 kg/m².      EXAM:   /85 (BP Location: Right arm, Patient Position: Sitting, Cuff Size: adult)   Pulse 77   Resp 18   Ht 5' 9.5\" (1.765 m)   Wt 169 lb (76.7 kg)   BMI 24.60 kg/m²   Physical Exam   Constitutional:       Appearance: Normal appearance.   HENT:      Head: Normocephalic.      Ear- R ear congestion   Eyes:       Conjunctiva/sclera: Conjunctivae normal.   Cardiovascular:      Rate and Rhythm: Normal rate and regular rhythm.      Heart sounds: Normal heart sounds. No murmur heard.  Pulmonary:      Effort: Pulmonary effort is normal.      Breath sounds: Normal breath sounds. No rhonchi or rales.   Abdominal:      General: Bowel sounds are normal.      Palpations: Abdomen is soft.      Tenderness: There is no abdominal tenderness.   Musculoskeletal:      Cervical back: Neck supple.      Right lower leg: No edema.      Left lower leg: No edema.   Skin:     General: Skin is warm and dry.   Neurological:      General: No focal deficit present.      Mental Status: He is alert and oriented to person, place, and time. Mental status is at baseline.   Psychiatric:         Mood and Affect: Mood normal.         Behavior: Behavior normal.       ASSESSMENT AND PLAN:   1. Non-recurrent acute suppurative otitis media of right ear without spontaneous rupture of tympanic membrane  - continue meclizine as needed  - will start antibiotics since symptoms not improving  - amoxicillin clavulanate 875-125 MG Oral Tab; Take 1 tablet by mouth 2 (two) times daily for 10 days.  Dispense: 20 tablet; Refill: 0    2. Myalgia  - chronic muscle aches for which he takes flexeril   - cyclobenzaprine 5 MG Oral Tab; Take 1-2 tablets (5-10 mg total) by mouth every 8 (eight) hours as needed.  Dispense: 90 tablet; Refill: 1    3. Essential hypertension  - well controlled  - continue current medications    4. Primary insomnia  - zolpidem as needed        The patient indicates understanding of these issues and agrees to the plan.    Tiffany Buchanan MD

## 2024-03-28 NOTE — TELEPHONE ENCOUNTER
Please review; protocol failed/No Protocol    LOV: Telemedicine 03/23/23-Dr. Singletary patient  Future Appointments   Date Time Provider Department Center   3/28/2024  4:40 PM Tiffany Buchanan MD [id number] [place]     Fill dates: 12/23/2023, 01/23/2024, 02/23/2024    Requested Prescriptions   Pending Prescriptions Disp Refills    Zolpidem Tartrate ER 12.5 MG Oral Tab CR 30 tablet 5     Sig: Take 1 tablet (12.5 mg total) by mouth nightly as needed.       Controlled Substance Medication Failed - 3/26/2024  3:14 PM        Failed - This medication is a controlled substance - forward to provider to refill           Future Appointments         Provider Department Appt Notes    Today Tiffany Buchanan MD [facility] [address], [place] Med Refill          Recent Outpatient Visits              1 year ago Myalgia    [facility] [place] Vetrone, Laura, MD    Telemedicine    1 year ago Myalgia    [facility] [place] Vetrone, Laura, MD    Office Visit    1 year ago Chronic pain of both knees    [facility] [place] Vetrone, Laura, MD    Telemedicine    1 year ago Benign paroxysmal positional vertigo, unspecified laterality    [facility] ([place] [place])Howard Laura, MD    Office Visit    2 years ago Essential hypertension    [facility] ([place], [place]), Romy Storm MD    Telemedicine

## 2024-06-08 DIAGNOSIS — K21.9 GASTROESOPHAGEAL REFLUX DISEASE WITHOUT ESOPHAGITIS: ICD-10-CM

## 2024-06-08 NOTE — TELEPHONE ENCOUNTER
Refill request from Destin Kilgore for:      omeprazole 20 MG Oral Capsule Delayed Release, TAKE 1 CAPSULE BY MOUTH EVERY MORNING, Disp: 90 capsule, Rfl: 3

## 2024-06-12 RX ORDER — OMEPRAZOLE 20 MG/1
CAPSULE, DELAYED RELEASE ORAL
Qty: 90 CAPSULE | Refills: 0 | Status: SHIPPED | OUTPATIENT
Start: 2024-06-12

## 2024-06-12 NOTE — TELEPHONE ENCOUNTER
Refill passed per [facility] protocol.    90 day refill given on 06/12/24, appointment needed for further refills.    Due for physical    Requested Prescriptions   Pending Prescriptions Disp Refills    omeprazole 20 MG Oral Capsule Delayed Release 90 capsule 3     Sig: TAKE 1 CAPSULE BY MOUTH EVERY MORNING       Gastrointestional Medication Protocol Passed - 6/9/2024  9:24 AM        Passed - In person appointment or virtual visit in the past 12 mos or appointment in next 3 mos     Recent Outpatient Visits              2 months ago Non-recurrent acute suppurative otitis media of right ear without spontaneous rupture of tympanic membrane    [facility]    [facility]    1 year ago [facility], [place] Vetrone, Laura, MD    Telemedicine    1 year ago [facility] [place] Vetrone, Laura, MD    Office Visit    1 year ago Chronic pain of both knees    [facility], [place] Vetrone, Laura, MD    Telemedicine    2 years ago Benign paroxysmal positional vertigo, unspecified laterality    [facility] Romy Singletary MD    Office Visit                         Recent Outpatient Visits              2 months ago Non-recurrent acute suppurative otitis media of right ear without spontaneous rupture of tympanic membrane    [facility] [place] K[place], Yasmin Irfan, MD    Office Visit    1 year ago [facility] [place] Vetrone, Laura, MD    Telemedicine    1 year ago [facility] [place] Vetrone, Laura, MD    Office Visit    1 year ago Chronic pain of both knees    [facility], [place] Vetrone, Laura, MD    Telemedicine    2 years ago Benign paroxysmal positional  vertigo, unspecified laterality    [place] [place]    Office Visit

## 2024-09-06 DIAGNOSIS — I10 ESSENTIAL HYPERTENSION: ICD-10-CM

## 2024-09-06 DIAGNOSIS — K21.9 GASTROESOPHAGEAL REFLUX DISEASE WITHOUT ESOPHAGITIS: ICD-10-CM

## 2024-09-06 NOTE — TELEPHONE ENCOUNTER
Pharmacy is requesting a refill for the following medication. Thank you    Current Outpatient Medications   Medication Sig Dispense Refill      90 capsule 0      30 tablet 5      90 tablet 1    lisinopril-hydroCHLOROthiazide 20-12.5 MG Oral Tab Take 1 tablet by mouth daily. 90 tablet 3                    30 tablet 2

## 2024-09-08 NOTE — TELEPHONE ENCOUNTER
Refill passed per [facility] protocol.    Requested Prescriptions   Pending Prescriptions Disp Refills    OMEPRAZOLE 20 MG Oral Capsule Delayed Release [Pharmacy Med Name: OMEPRAZOLE 20MG CAPSULES] 90 capsule 0     Sig: TAKE 1 CAPSULE BY MOUTH EVERY MORNING       Gastrointestional Medication Protocol Passed - 9/6/2024  5:49 AM        Passed - In person appointment or virtual visit in the past 12 mos or appointment in next 3 mos     Recent Outpatient Visits              5 months ago Non-recurrent acute suppurative otitis media of right ear without spontaneous rupture of tympanic membrane    [facility] [place] Kagzi, Yasmin Irfan, MD    Office Visit    1 year ago [facility] [place] Vetrone, Laura, MD    Telemedicine    1 year ago [facility] [place] Vetrone, Laura, MD    Office Visit    1 year ago Chronic pain of both knees    [facility] [place] Vetrone, Laura, MD    Telemedicine    2 years ago Benign paroxysmal positional vertigo, unspecified laterality    [place] Romy Singletary MD    Office Visit                               Recent Outpatient Visits              5 months ago Non-recurrent acute suppurative otitis media of right ear without spontaneous rupture of tympanic membrane    [facility]    1 year ago [facility] [place] Vetrone, Laura, MD    Telemedicine    1 year ago [facility] [place] Vetrone, Laura, MD    Office Visit    1 year ago Chronic pain of both knees    [facility] [place] Vetrone, Laura, MD    Telemedicine    2 years ago Benign paroxysmal positional vertigo, unspecified laterality    [facility], [facility] [place] Romy Singletary MD    Office Visit

## 2024-09-10 RX ORDER — LISINOPRIL AND HYDROCHLOROTHIAZIDE 12.5; 2 MG/1; MG/1
1 TABLET ORAL DAILY
Qty: 90 TABLET | Refills: 3 | Status: SHIPPED | OUTPATIENT
Start: 2024-09-10

## 2024-09-10 NOTE — TELEPHONE ENCOUNTER
Please Review. Protocol Failed; No Protocol   Previously written by Dr. Singletary   Requested Prescriptions   Pending Prescriptions Disp Refills    lisinopril-hydroCHLOROthiazide 20-12.5 MG Oral Tab 90 tablet 3     Sig: Take 1 tablet by mouth daily.       Hypertension Medications Protocol Failed - 9/10/2024 10:52 AM        Failed - CMP or BMP in past 12 months        Failed - EGFRCR or GFRNAA > 50     GFR Evaluation            Passed - Last BP reading less than 140/90     BP Readings from Last 1 Encounters:   03/28/24 128/85               Passed - In person appointment or virtual visit in the past 12 mos or appointment in next 3 mos     Recent Outpatient Visits              5 months ago Non-recurrent acute suppurative otitis media of right ear without spontaneous rupture of tympanic membrane    [facility] [place] K[place]zi, Yasmin Irfan, MD    Office Visit    1 year ago [facility], [place] Vetrone, Laura, MD    Telemedicine    1 year ago [facility], [place] Vetrone, Laura, MD    Office Visit    1 year ago Chronic pain of both knees    [facility] [place] Vetrone, Laura, MD    Telemedicine    2 years ago Benign paroxysmal positional vertigo, unspecified laterality    [facility], Romy Storm MD    Office Visit                               Recent Outpatient Visits              5 months ago Non-recurrent acute suppurative otitis media of right ear without spontaneous rupture of tympanic membrane    [facility] [place] Kagzi, Yasmin Irfan, MD    Office Visit    1 year ago [facility], [place] Vetrone, Laura, MD    Telemedicine    1 year ago [facility], [place] Vetrone, Laura, MD    Office Visit    1 year ago Chronic  pain of both knees    [facility], [place] Vetrone, Laura, MD    Telemedicine    2 years ago Benign paroxysmal positional vertigo, unspecified laterality    [facility] [place] [facility]    Office Visit

## 2024-09-24 DIAGNOSIS — F51.01 PRIMARY INSOMNIA: ICD-10-CM

## 2024-09-26 DIAGNOSIS — F51.01 PRIMARY INSOMNIA: ICD-10-CM

## 2024-09-28 RX ORDER — ZOLPIDEM TARTRATE 12.5 MG/1
12.5 TABLET, FILM COATED, EXTENDED RELEASE ORAL NIGHTLY PRN
Qty: 30 TABLET | Refills: 5 | OUTPATIENT
Start: 2024-09-28

## 2024-09-28 NOTE — TELEPHONE ENCOUNTER
See also 24 refill encounter .        Name:SATURDAY TRIAGE RN                      2024 10:45:15 AM  ProfileId:    [id number]                   [facility]  Department:[place] ANSWERING SERVICE  ======================================================================  Text Messages    Message # [id number]         2024 10:42a   [STAS]  To:  SATURDAY TRIAGE RN  From:  Primary MD:  Phone#:  ----------------------------------------------------------------------  YOJANA [phone] RE: SENT MY CHART FOR RX REFILL BUT MEAGAN ZHU [facility]        (Message Delivered)   D E L I V E R I E S :        COPIED AND PASTE 24 [facility] ;  2024  Yojana Ng   to P [facility] Refills         24  9:13 AM  Refills have been requested for the following medications:         Zolpidem Tartrate ER 12.5 MG Oral Tab CR [Tiffany Buchnaan]      Patient Comment: Please put a rush on this. I am out and my pharmacy has requested it from you already.      [id number] [address] AT SEC OF [facility], [phone], [phone]

## 2024-09-28 NOTE — TELEPHONE ENCOUNTER
See medication record==sent on 3/28/24 for 30 tablets with 5 refills==should be good until today 9/18/24.     Please review; protocol failed/no protocol.     Requested Prescriptions   Pending Prescriptions Disp Refills    ZOLPIDEM TARTRATE ER 12.5 MG Oral Tab CR [Pharmacy Med Name: ZOLPIDEM ER 12.5MG TABLETS] 30 tablet 0     Sig: TAKE 1 TABLET(12.5 MG) BY MOUTH EVERY NIGHT AS NEEDED       Controlled Substance Medication Failed - 9/28/2024 11:26 AM        Failed - This medication is a controlled substance - forward to provider to refill              Recent Outpatient Visits              6 months ago Non-recurrent acute suppurative otitis media of right ear without spontaneous rupture of tympanic membrane    [facility] [place]    Office Visit    [facility]    [facility], [place] Vetrone, Laura, MD    Telemedicine    [facility]    [facility], [place] Vetrone, Laura, MD    Office Visit    2 years ago Chronic pain of both knees    [facility], [place] Vetrone, Laura, MD    Telemedicine    2 years ago Benign paroxysmal positional vertigo, unspecified laterality    [facility], Romy Storm MD    Office Visit

## 2024-09-30 RX ORDER — ZOLPIDEM TARTRATE 12.5 MG/1
12.5 TABLET, FILM COATED, EXTENDED RELEASE ORAL NIGHTLY PRN
Qty: 30 TABLET | Refills: 0 | Status: SHIPPED | OUTPATIENT
Start: 2024-09-30

## 2024-09-30 NOTE — TELEPHONE ENCOUNTER
Marked High Priority, patient states out of medication    Please review.  Protocol failed/has no protocol.    Zolpidem ER 12.5mg Recent fills each quantity 30 : 5/26, 6/28, 7/24, 8/26  Last prescription written: 3/28/24 #30 +5R  Last office visit: 3/28/24

## 2024-10-26 DIAGNOSIS — F51.01 PRIMARY INSOMNIA: ICD-10-CM

## 2024-10-29 RX ORDER — ZOLPIDEM TARTRATE 12.5 MG/1
12.5 TABLET, FILM COATED, EXTENDED RELEASE ORAL NIGHTLY PRN
Qty: 30 TABLET | Refills: 0 | Status: SHIPPED | OUTPATIENT
Start: 2024-10-29

## 2024-10-29 NOTE — TELEPHONE ENCOUNTER
Please review. Protocol Failed; No Protocol      Recent fills: 7/24/2024, 8/26/2024, 9/30/2024  Last Rx written: 9/30/2024  Last office visit: 3/28/2024          Requested Prescriptions   Pending Prescriptions Disp Refills    ZOLPIDEM TARTRATE ER 12.5 MG Oral Tab CR [Pharmacy Med Name: ZOLPIDEM ER 12.5MG TABLETS] 30 tablet 0     Sig: TAKE 1 TABLET(12.5 MG) BY MOUTH EVERY NIGHT AS NEEDED       Controlled Substance Medication Failed - 10/29/2024 10:43 AM        Failed - This medication is a controlled substance - forward to provider to refill                 Recent Outpatient Visits              7 months ago Non-recurrent acute suppurative otitis media of right ear without spontaneous rupture of tympanic membrane    [facility]    [facility]    [facility], [place] Vetrone, Laura, MD    Telemedicine    1 [facility]    [facility], [place] Vetrone, Laura, MD    Office Visit    2 years ago Chronic pain of both knees    [facility], [place] Vetrone, Laura, MD    Telemedicine    2 years ago Benign paroxysmal positional vertigo, unspecified laterality    [facility], Romy Storm MD    Office Visit

## 2024-11-24 DIAGNOSIS — F51.01 PRIMARY INSOMNIA: ICD-10-CM

## 2024-11-25 DIAGNOSIS — F51.01 PRIMARY INSOMNIA: ICD-10-CM

## 2024-11-27 ENCOUNTER — TELEPHONE (OUTPATIENT)
Dept: FAMILY MEDICINE CLINIC | Facility: CLINIC | Age: 54
End: 2024-11-27

## 2024-11-27 NOTE — TELEPHONE ENCOUNTER
Current Outpatient Medications   Medication Sig Dispense Refill    Zolpidem Tartrate ER 12.5 MG Oral Tab CR Take 1 tablet (12.5 mg total) by mouth nightly as needed. 30 tablet 0     Message:Current prescription has no refills remaining or is about to .

## 2024-11-28 RX ORDER — ZOLPIDEM TARTRATE 12.5 MG/1
12.5 TABLET, FILM COATED, EXTENDED RELEASE ORAL NIGHTLY PRN
Qty: 30 TABLET | Refills: 0 | Status: SHIPPED | OUTPATIENT
Start: 2024-11-28

## 2024-11-28 NOTE — TELEPHONE ENCOUNTER
Please review; protocol failed/ has no protocol      Recent fills: 10/29/2024,09/30/2024,08/26/2024  Last Rx written: 10/29/2024  Last Office Visit: 03/28/2024    Recent Visits  Date Type Provider Dept   03/28/24 Office Visit Tiffany Buchanan MD [facility]         Requested Prescriptions   Pending Prescriptions Disp Refills    ZOLPIDEM TARTRATE ER 12.5 MG Oral Tab CR [Pharmacy Med Name: ZOLPIDEM ER 12.5MG TABLETS] 30 tablet 0     Sig: TAKE 1 TABLET(12.5 MG) BY MOUTH EVERY NIGHT AS NEEDED       Controlled Substance Medication Failed - 11/28/2024 12:07 PM        Failed - This medication is a controlled substance - forward to provider to refill           Recent Outpatient Visits              8 months ago Non-recurrent acute suppurative otitis media of right ear without spontaneous rupture of tympanic membrane    [facility] [address], [place] Tiffany Buchanan MD    Office Visit    1 year ago Myalgia    [facility], [place] Vetrone, Laura, MD    Telemedicine    1 year ago Myalgia    [facility] [place] V[place]one, Laura, MD    Office Visit    2 years ago Chronic pain of both knees    [facility] [place] Vetrone, Laura, MD    Telemedicine    2 years ago Benign paroxysmal positional vertigo, unspecified laterality    [facility], Romy Storm MD    Office Visit

## 2024-11-29 RX ORDER — ZOLPIDEM TARTRATE 12.5 MG/1
12.5 TABLET, FILM COATED, EXTENDED RELEASE ORAL NIGHTLY PRN
Qty: 30 TABLET | Refills: 0 | OUTPATIENT
Start: 2024-11-29

## 2024-12-25 DIAGNOSIS — F51.01 PRIMARY INSOMNIA: ICD-10-CM

## 2024-12-25 RX ORDER — ZOLPIDEM TARTRATE 12.5 MG/1
12.5 TABLET, FILM COATED, EXTENDED RELEASE ORAL NIGHTLY PRN
Qty: 30 TABLET | Refills: 0 | Status: CANCELLED | OUTPATIENT
Start: 2024-12-25

## 2024-12-27 NOTE — TELEPHONE ENCOUNTER
Outpatient Medication Detail     Disp Refills Start End    Zolpidem Tartrate ER 12.5 MG Oral Tab CR 30 tablet 3 12/27/2024 --    Sig - Route: Take 1 tablet (12.5 mg total) by mouth nightly as needed. - Oral    Sent to pharmacy as: Zolpidem Tartrate ER 12.5 MG Oral Tablet Extended Release (AMBIEN CR)    E-Prescribing Status: Receipt confirmed by pharmacy (12/27/2024 11:31 AM CST)      Associated Diagnoses    Primary insomnia  - Primary        Pharmacy    [id number] - [facility] [address] AT [address], [phone], [phone]

## 2025-01-26 ENCOUNTER — PATIENT MESSAGE (OUTPATIENT)
Dept: INTERNAL MEDICINE CLINIC | Facility: CLINIC | Age: 55
End: 2025-01-26

## 2025-01-26 DIAGNOSIS — F51.01 PRIMARY INSOMNIA: ICD-10-CM

## 2025-01-26 DIAGNOSIS — Z12.11 SCREENING FOR COLON CANCER: Primary | ICD-10-CM

## 2025-01-30 RX ORDER — ZOLPIDEM TARTRATE 12.5 MG/1
12.5 TABLET, FILM COATED, EXTENDED RELEASE ORAL NIGHTLY PRN
Qty: 30 TABLET | Refills: 3 | Status: SHIPPED | OUTPATIENT
Start: 2025-01-30

## 2025-01-30 NOTE — TELEPHONE ENCOUNTER
Please review; protocol failed.    Requested Prescriptions   Pending Prescriptions Disp Refills    Zolpidem Tartrate ER 12.5 MG Oral Tab CR 30 tablet 3     Sig: Take 1 tablet (12.5 mg total) by mouth nightly as needed.       Controlled Substance Medication Failed - 1/29/2025 10:44 PM        Failed - This medication is a controlled substance - forward to provider to refill        Passed - Medication is active on med list

## 2025-03-08 ENCOUNTER — LAB ENCOUNTER (OUTPATIENT)
Dept: LAB | Facility: HOSPITAL | Age: 55
End: 2025-03-08
Attending: INTERNAL MEDICINE
Payer: COMMERCIAL

## 2025-03-08 DIAGNOSIS — Z00.00 ANNUAL PHYSICAL EXAM: ICD-10-CM

## 2025-03-08 LAB
ALBUMIN SERPL-MCNC: 4.7 G/DL (ref 3.2–4.8)
ALBUMIN/GLOB SERPL: 1.6 {RATIO} (ref 1–2)
ALP LIVER SERPL-CCNC: 72 U/L
ALT SERPL-CCNC: 30 U/L
ANION GAP SERPL CALC-SCNC: 6 MMOL/L (ref 0–18)
AST SERPL-CCNC: 20 U/L (ref ?–34)
BASOPHILS # BLD AUTO: 0.05 X10(3) UL (ref 0–0.2)
BASOPHILS NFR BLD AUTO: 0.4 %
BILIRUB SERPL-MCNC: 0.7 MG/DL (ref 0.3–1.2)
BUN BLD-MCNC: 15 MG/DL (ref 9–23)
BUN/CREAT SERPL: 13.6 (ref 10–20)
CALCIUM BLD-MCNC: 9.4 MG/DL (ref 8.7–10.4)
CHLORIDE SERPL-SCNC: 101 MMOL/L (ref 98–112)
CHOLEST SERPL-MCNC: 236 MG/DL (ref ?–200)
CO2 SERPL-SCNC: 29 MMOL/L (ref 21–32)
CREAT BLD-MCNC: 1.1 MG/DL
DEPRECATED RDW RBC AUTO: 39 FL (ref 35.1–46.3)
EGFRCR SERPLBLD CKD-EPI 2021: 81 ML/MIN/1.73M2 (ref 60–?)
EOSINOPHIL # BLD AUTO: 0.12 X10(3) UL (ref 0–0.7)
EOSINOPHIL NFR BLD AUTO: 1 %
ERYTHROCYTE [DISTWIDTH] IN BLOOD BY AUTOMATED COUNT: 12.3 % (ref 11–15)
FASTING PATIENT LIPID ANSWER: YES
FASTING STATUS PATIENT QL REPORTED: YES
GLOBULIN PLAS-MCNC: 2.9 G/DL (ref 2–3.5)
GLUCOSE BLD-MCNC: 98 MG/DL (ref 70–99)
HCT VFR BLD AUTO: 46.1 %
HDLC SERPL-MCNC: 48 MG/DL (ref 40–59)
HGB BLD-MCNC: 16.3 G/DL
IMM GRANULOCYTES # BLD AUTO: 0.05 X10(3) UL (ref 0–1)
IMM GRANULOCYTES NFR BLD: 0.4 %
LDLC SERPL CALC-MCNC: 165 MG/DL (ref ?–100)
LYMPHOCYTES # BLD AUTO: 2.55 X10(3) UL (ref 1–4)
LYMPHOCYTES NFR BLD AUTO: 20.4 %
MCH RBC QN AUTO: 30.7 PG (ref 26–34)
MCHC RBC AUTO-ENTMCNC: 35.4 G/DL (ref 31–37)
MCV RBC AUTO: 86.8 FL
MONOCYTES # BLD AUTO: 1.23 X10(3) UL (ref 0.1–1)
MONOCYTES NFR BLD AUTO: 9.8 %
NEUTROPHILS # BLD AUTO: 8.51 X10 (3) UL (ref 1.5–7.7)
NEUTROPHILS # BLD AUTO: 8.51 X10(3) UL (ref 1.5–7.7)
NEUTROPHILS NFR BLD AUTO: 68 %
NONHDLC SERPL-MCNC: 188 MG/DL (ref ?–130)
OSMOLALITY SERPL CALC.SUM OF ELEC: 283 MOSM/KG (ref 275–295)
PLATELET # BLD AUTO: 306 10(3)UL (ref 150–450)
POTASSIUM SERPL-SCNC: 4.1 MMOL/L (ref 3.5–5.1)
PROT SERPL-MCNC: 7.6 G/DL (ref 5.7–8.2)
RBC # BLD AUTO: 5.31 X10(6)UL
SODIUM SERPL-SCNC: 136 MMOL/L (ref 136–145)
TRIGL SERPL-MCNC: 125 MG/DL (ref 30–149)
TSI SER-ACNC: 0.92 UIU/ML (ref 0.55–4.78)
VLDLC SERPL CALC-MCNC: 25 MG/DL (ref 0–30)
WBC # BLD AUTO: 12.5 X10(3) UL (ref 4–11)

## 2025-03-08 PROCEDURE — 36415 COLL VENOUS BLD VENIPUNCTURE: CPT

## 2025-03-08 PROCEDURE — 80061 LIPID PANEL: CPT

## 2025-03-08 PROCEDURE — 85025 COMPLETE CBC W/AUTO DIFF WBC: CPT

## 2025-03-08 PROCEDURE — 84443 ASSAY THYROID STIM HORMONE: CPT

## 2025-03-08 PROCEDURE — 80053 COMPREHEN METABOLIC PANEL: CPT

## 2025-03-12 ENCOUNTER — TELEPHONE (OUTPATIENT)
Dept: INTERNAL MEDICINE CLINIC | Facility: CLINIC | Age: 55
End: 2025-03-12

## 2025-03-12 DIAGNOSIS — D72.829 LEUKOCYTOSIS, UNSPECIFIED TYPE: Primary | ICD-10-CM

## 2025-03-12 RX ORDER — ATORVASTATIN CALCIUM 40 MG/1
40 TABLET, FILM COATED ORAL NIGHTLY
Qty: 90 TABLET | Refills: 1 | Status: SHIPPED | OUTPATIENT
Start: 2025-03-12 | End: 2025-09-08

## 2025-05-12 ENCOUNTER — TELEPHONE (OUTPATIENT)
Facility: CLINIC | Age: 55
End: 2025-05-12

## 2025-05-12 DIAGNOSIS — Z86.0100 HX OF COLONIC POLYPS: ICD-10-CM

## 2025-05-12 DIAGNOSIS — Z12.11 SPECIAL SCREENING FOR MALIGNANT NEOPLASMS, COLON: Primary | ICD-10-CM

## 2025-05-12 NOTE — TELEPHONE ENCOUNTER
Dr. Saenz    Colon recall.     Please provide orders if ok to schedule directly.     Reviewed allergies, pharmacy, medications and health history.     Last Procedure, Date, MD:  12/12/2018  Last Diagnosis:  mild gastropathy, small proximal colon polyp removed, mild left diverticulosis   Recalled (mth/yrs): 5 years  Sedation Used Previously:  MAC  Last Prep Used (if known):  Trilyte  Quality Of Prep (if known): good with washing  Anticoagulants: lisinopril-hydrochlorothiazide  Diuretics: lisinopril-hydrochlorothiazide  ACE/ARB Inhibitor's: no  Diabetic Medication (oral/insulin): no  Weight loss Medication: no  Iron/Herbal/Multivitamin Supplements: no  Marijuana/Vaping/CBD: no  Height/Weight: 5'9.5\" 167 lbs  BMI: 24.3  Hx of Cardiac &/or CVA Issues (MI/Stroke): no  Devices Pacemaker/Defibrillator/Stents: no  Respiratory Issues/Oxygen Use/TIAGO/COPD: no  Issues w/ Anesthesia: no    Symptoms (Y/N): reports abdominal pain that comes and goes every once and a while.  Denies any constant pain.    Symptoms Details: n/a    Special Comments/Notes: n/a    Thank you!

## 2025-05-12 NOTE — TELEPHONE ENCOUNTER
12/13/18  6:10 PM  Note  Polo Saenz MD  [facility] Clinical Staff              GI staff: please place recall for colonoscopy in 5 years       Results letter mailed out to pt today. Colon recall entered for 5 years. Snapshot updated.             Component 12/12/18  9:54 AM   Case Report Surgical Pathology                                Case: [id number]                                  Authorizing Provider:  Polo Saenz MD     Collected:           12/12/2018 09:54 AM          Pathologist:           Ryan Mirza MD            Received:            12/13/2018 06:08 AM          Specimens:   A) - Gastric biopsy                                                                                 B) - Colon polyp, Descending Colon polyp                                               Final Diagnosis:       A. Stomach; biopsy:  Gastric oxyntic and antral type mucosa with focal parietal cell hyperplasia  Diff-quik stain (with reactive positive control) is negative for Helicobacter pylori-like organisms     B. Descending colon polyp; biopsy:  Tubular adenoma      Electronically signed by Ryan Mirza MD on 12/13/2018 at  1:10 PM   Embedded Images    Clinical Information    Z01.89 Encounter For Other Specified Special Examinations.    GERD/rectal bleed. Gastropathy, polyp, diverticulosis, hemorrhoids.   Gross Description    Specimen A is submitted in formalin labeled “Frazier, gastric biopsy” and consists of three tan, soft tissue fragments ranging from 0.3 - 0.6 cm in greatest dimension and measuring 0.7 x 0.6 x 0.2 cm in aggregate. The specimen is entirely submitted in cassette A1.     Specimen B is submitted in formalin labeled “Frazier, descending colon polyp” and consists of a 0.3 x 0.2 x 0.2 cm tan, soft tissue fragment. The specimen is entirely submitted in cassette B1. (al)     Ryan Mirza M.D./[facility]                  Interpretation Abnormal Abnormal    Electronically signed by Ryan Mirza MD on 12/13/2018 at   1:10 PM   Resulting Agency [facility]     Specimen Collected: 12/12/18  9:54 AM Last Resulted: 12/13/18  1:10 PM   Received From: [facility]  Result Received: 04/21/22  1:40 PM

## 2025-05-13 RX ORDER — SODIUM, POTASSIUM,MAG SULFATES 17.5-3.13G
SOLUTION, RECONSTITUTED, ORAL ORAL
Qty: 1 KIT | Refills: 0 | Status: SHIPPED | OUTPATIENT
Start: 2025-05-13

## 2025-05-13 NOTE — TELEPHONE ENCOUNTER
TIERNEY Pepe[facility]-    Please send prep to patient's pharmacy, patient is scheduled on 8/15/2025.    Thanks!

## 2025-05-13 NOTE — TELEPHONE ENCOUNTER
Ok to schedule colonoscopy with MAC with split suprep for colorectal cancer screening and history of adenomatous colon polyp at [facility] or hospital on a Friday    Hold lisinopril/hydrochlorothiazide per anesthesia/endoscopy lab required protocol    Thanks    Polo Saenz MD  [facility] - Gastroenterology

## 2025-05-13 NOTE — TELEPHONE ENCOUNTER
Scheduled for:  Colonoscopy [id number]  Provider Name:  Dr. Saenz   Date:  8/15/2025  Location:  [facility]  Sedation:  MAC  Time:  1:15pm, pt is aware [facility] will call with arrival time  Prep:  Suprep  Meds/Allergies Reconciled?:  Physician reviewed     Diagnosis with codes:  colorectal cancer screening Z12.11 and history of adenomatous colon polyp Z86.010  Was patient informed to call insurance with codes (Y/N):  Yes     Referral sent?:  Referral was sent at the time of electronic surgical scheduling.   [facility] or [facility] notified?:  I sent an electronic request to Endo Scheduling and received a confirmation today.      Medication Orders:  Hold blood pressure medication the evening before and morning of procedure  Misc Orders:  n/a     Further instructions given by staff:   I discussed the prep instructions with the patient which he verbally understood and is aware that I will send the instructions today.    show

## 2025-05-21 ENCOUNTER — TELEPHONE (OUTPATIENT)
Facility: CLINIC | Age: 55
End: 2025-05-21

## 2025-05-22 ENCOUNTER — TELEPHONE (OUTPATIENT)
Dept: SURGERY | Facility: CLINIC | Age: 55
End: 2025-05-22

## 2025-06-04 ENCOUNTER — OFFICE VISIT (OUTPATIENT)
Dept: SURGERY | Facility: CLINIC | Age: 55
End: 2025-06-04
Payer: COMMERCIAL

## 2025-06-04 ENCOUNTER — LAB ENCOUNTER (OUTPATIENT)
Dept: LAB | Facility: HOSPITAL | Age: 55
End: 2025-06-04
Attending: INTERNAL MEDICINE
Payer: COMMERCIAL

## 2025-06-04 DIAGNOSIS — N20.0 NEPHROLITHIASIS: ICD-10-CM

## 2025-06-04 DIAGNOSIS — D72.829 LEUKOCYTOSIS, UNSPECIFIED TYPE: ICD-10-CM

## 2025-06-04 DIAGNOSIS — R97.20 ELEVATED PSA: Primary | ICD-10-CM

## 2025-06-04 LAB
BASOPHILS # BLD AUTO: 0.05 X10(3) UL (ref 0–0.2)
BASOPHILS NFR BLD AUTO: 0.5 %
BILIRUB UR QL: NEGATIVE
CLARITY UR: CLEAR
DEPRECATED RDW RBC AUTO: 37.6 FL (ref 35.1–46.3)
EOSINOPHIL # BLD AUTO: 0.24 X10(3) UL (ref 0–0.7)
EOSINOPHIL NFR BLD AUTO: 2.3 %
ERYTHROCYTE [DISTWIDTH] IN BLOOD BY AUTOMATED COUNT: 12 % (ref 11–15)
GLUCOSE UR-MCNC: NORMAL MG/DL
HCT VFR BLD AUTO: 43.9 % (ref 39–53)
HGB BLD-MCNC: 15.4 G/DL (ref 13–17.5)
HGB UR QL STRIP.AUTO: NEGATIVE
IMM GRANULOCYTES # BLD AUTO: 0.03 X10(3) UL (ref 0–1)
IMM GRANULOCYTES NFR BLD: 0.3 %
KETONES UR-MCNC: NEGATIVE MG/DL
LEUKOCYTE ESTERASE UR QL STRIP.AUTO: NEGATIVE
LYMPHOCYTES # BLD AUTO: 2.6 X10(3) UL (ref 1–4)
LYMPHOCYTES NFR BLD AUTO: 24.9 %
MCH RBC QN AUTO: 30.3 PG (ref 26–34)
MCHC RBC AUTO-ENTMCNC: 35.1 G/DL (ref 31–37)
MCV RBC AUTO: 86.2 FL (ref 80–100)
MONOCYTES # BLD AUTO: 1.24 X10(3) UL (ref 0.1–1)
MONOCYTES NFR BLD AUTO: 11.9 %
NEUTROPHILS # BLD AUTO: 6.27 X10 (3) UL (ref 1.5–7.7)
NEUTROPHILS # BLD AUTO: 6.27 X10(3) UL (ref 1.5–7.7)
NEUTROPHILS NFR BLD AUTO: 60.1 %
NITRITE UR QL STRIP.AUTO: NEGATIVE
PH UR: 6 [PH] (ref 5–8)
PLATELET # BLD AUTO: 285 10(3)UL (ref 150–450)
PROT UR-MCNC: NEGATIVE MG/DL
RBC # BLD AUTO: 5.09 X10(6)UL (ref 4.3–5.7)
SP GR UR STRIP: 1.01 (ref 1–1.03)
UROBILINOGEN UR STRIP-ACNC: NORMAL
WBC # BLD AUTO: 10.4 X10(3) UL (ref 4–11)

## 2025-06-04 PROCEDURE — 99204 OFFICE O/P NEW MOD 45 MIN: CPT | Performed by: UROLOGY

## 2025-06-04 PROCEDURE — 85025 COMPLETE CBC W/AUTO DIFF WBC: CPT

## 2025-06-04 PROCEDURE — 36415 COLL VENOUS BLD VENIPUNCTURE: CPT

## 2025-06-04 NOTE — PROGRESS NOTES
Mohini Mercedes MD  Department of Urology  [address] [place] [address] Rd[address]  [place] [address]    T: [phone]  F: [phone]    To: Tiffany Buchanan MD   [address] [facility] [address]  [place] [address]    Re: Bernard Frazier   MRN: [id number]  : 1970    Dear Tiffany Buchanan MD,    Today I had the pleasure of seeing Bernard Frazier in my clinic. As you know, Mr. Frazier is a pleasant 54 year old year old male who I am seeing for prostate check. Patient was last seen in this department on Visit date not found.    Briefly, patient had a PSA last in 2023 that was 2.22.  Please know that he saw Dr. Renae last in 2018 for kidney stones.  That his last cross-sectional imaging was in 2018 that demonstrated small right ureteral stones     PAST MEDICAL HISTORY:  Past Medical History[1]     PAST SURGICAL HISTORY:  Past Surgical History[2]      ALLERGIES:  Allergies[3]      MEDICATIONS:  Current Outpatient Medications   Medication Instructions    Acetaminophen (TYLENOL ARTHRITIS PAIN OR) Take by mouth.    atorvastatin (LIPITOR) 40 mg, Oral, Nightly    cyclobenzaprine (FLEXERIL) 5-10 mg, Oral, Every 8 hours PRN    HYDROcodone-acetaminophen 5-325 MG Oral Tab 1 tablet, As needed    ketoconazole 2 % External Shampoo Three times weekly    lisinopril-hydroCHLOROthiazide 20-12.5 MG Oral Tab 1 tablet, Oral, Daily    meclizine 12.5 MG Oral Tab 1-2 tabs tid prn dizziness.  No driving 8 hours after taking this-causes drowsiness.    Na Sulfate-K Sulfate-Mg Sulf (SUPREP BOWEL PREP KIT) 17.5-3.13-1.6 GM/177ML Oral Solution Take bowel preparation as provided by gastroenterology office, or visit our website at [email or web address]    omeprazole 20 MG Oral Capsule Delayed Release TAKE 1 CAPSULE BY MOUTH EVERY MORNING    Zolpidem Tartrate ER (AMBIEN CR) 12.5 mg, Oral, Nightly PRN        FAMILY HISTORY:  Family History[4]     SOCIAL HISTORY:  Short Social Hx on  File[5]       PHYSICAL EXAMINATION:  There were no vitals filed for this visit.  CONSTITUTIONAL: No apparent distress, cooperative and communicative  NEUROLOGIC: Alert and oriented   HEAD: Normocephalic, atraumatic   EYES: Sclera non-icteric   ENT: Hearing intact, moist mucous membranes   NECK: No obvious goiter or masses   RESPIRATORY: Normal respiratory effort, Nonlabored breathing on room air  SKIN: No evident rashes   ABDOMEN: Soft, nontender, nondistended, no rebound tenderness, no guarding, no masses      REVIEW OF SYSTEMS:    A comprehensive 10-point review of systems was completed.  Pertinent positives and negatives are noted in the the HPI.       LABORATORY DATA:  Prostate Specific Antigen Screen 2.22           IMAGING REVIEW:  Study Result    Narrative   PROCEDURE:   CT ABDOMEN + PELVIS KIDNEYSTONE 2D RNDR (NO IV NO ORAL) (CPT=74176)     COMPARISON: [facility], CT ABDOMEN + PELVIS KIDNEYSTONE 2D RNDR (NO IV NO ORAL) (CPT=741, 6/16/2018, 9:07.     INDICATIONS: RT flank pain, lithotripsy this morning,     TECHNIQUE: CT images of the abdomen and pelvis were obtained without intravenous contrast material.  Automated exposure control for dose reduction was used. Adjustment of the mA and/or kV was done based on the patient's size. Use of iterative  reconstruction technique for dose reduction was used.       FINDINGS:  KIDNEYS:   There are 2 stones in the distal right ureter located approximately 8 mm from the ureterovesicular junction (UVJ), which measure 2 and 3 mm in diameter.  There is mild right hydroureteronephrosis an additional calculi measuring up to 3 mm are  present within the right kidney.  At least 5 stones are present within the right kidney.  Right kidney appears edematous having a lower density compared with the contralateral kidney and there is fat stranding around the right kidney.  No organized  measurable fluid collection.  ADRENALS:   No defined mass or abnormal enlargement.     URINARY BLADDER: No visible focal wall thickening, lesion or calculus.    LIVER: No enlargement, atrophy, abnormal density, or significant focal lesion.    BILIARY: The gallbladder is present.  No intra- or extrahepatic biliary ductal dilatation.  SPLEEN: No enlargement or focal lesion.    STOMACH: No gross gastric mass, obstruction or focal abnormality.  Duodenum unremarkable.  PANCREAS: No lesion, fluid collection, ductal dilatation, or atrophy.    AORTA/VASCULAR:   There is calcific aortic atherosclerosis, without aneurysm.  RETROPERITONEUM: No mass or enlarged adenopathy.    BOWEL/MESENTERY: There is no small or large bowel obstruction. The terminal ileum and appendix (series 5, image 43) are within normal limits. There is descending and sigmoid colonic diverticulosis. There is no free air, free fluid, or lymphadenopathy in   the abdomen or pelvis.  ABDOMINAL WALL: Normal.  No mass or hernia.  PELVIC NODES: No enlarged mass or adenopathy.    PELVIC ORGANS: No visible mass.  Pelvic organs appropriate for patient age.    BONES:   No significant bony lesion or fracture.  LUNG BASES: No visible pulmonary or pleural disease.  OTHER: Negative.        Impression   CONCLUSION:  1.  Mild right hydroureteronephrosis related to 2 closely adjacent obstructing stones in the distal right ureter approximately 8 mm from the ureterovesicular junction (UVJ).  The stones measure 2 and 3 mm in diameter, respectively.  Additional stones  measuring up to 3 mm in diameter are present within the right kidney.  Right kidney appears edematous which raises the possibility of superimposed urinary infection.  2.  Scattered colonic diverticulosis.        Dictated by (CST): Nate Boothe MD on 7/11/2018 at 15:01      Approved by (CST): Nate Boothe MD on 7/11/2018 at 15:04              OTHER RELEVANT DATA:   none     IMPRESSION: 1.  History of kidney stones without any recent cross-sectional imaging.  I offered ultrasound versus CT  scan    Patient was counseled on stone prevention methods including hydration (until urine is clear), limiting salt and red meat/meat intake, eating a normal calcium diet, and drinking lemon with water. We also talked about reasons to go to the emergency room - namely acute flank pain associated with fevers, chills, nausea or vomiting.    2.  Prostate cancer screening-last PSA in 2023 was unremarkable-offered patient PSA    We discussed reasons for PSA elevation including enlarged prostate, \"prostate jostling\", recent ejaculation x72 hours prior to PSA collection, UTI and malignancy.        PLAN:  PSA  RBUS  UA reflex  Will message with above    Thank you for referring this very pleasant patient to my clinic. If you have any questions or concerns, please do not hesitate to contact me.    Sincerely,  Mohini Mercedes MD    30 minutes were spent on this patient at this visit obtaining a history, reviewing medical records, developing a treatment plan, counseling and discussing treatment strategy with patient, coordination of care and documentation.     The 21st Century Cures Act makes medical notes available to patients in the interest of transparency.  However, please be advised that this is a medical document.  It is intended as a peer to peer communication.  It is written in medical language and may contain abbreviations or verbiage that are technical and unfamiliar.  It may appear blunt or direct.  Medical documents are intended to carry relevant information, facts as evident, and the clinical opinion of the practitioner.         [1]   Past Medical History:   Colon adenoma    Degenerative joint disease (DJD) of lumbar spine    Esophageal reflux    Essential hypertension    Kidney stones    Varicose veins of both lower extremities   [2]   Past Surgical History:  Procedure Laterality Date    Colonoscopy  12/12/2018    Egd  12/12/2018    Knee arthroscopy Left 1988    Laminectomy,lumbar  2011    Repair of nasal septum   2009    Vein ligation and stripping Bilateral 2002   [3]   Allergies  Allergen Reactions    Levaquin [Levofloxacin] RASH   [4]   Family History  Problem Relation Age of Onset    Diabetes Father     Heart Disorder Father     Heart Attack Father     Cancer Mother         uterine    Heart Attack Sister         x2   [5]   Social History  Socioeconomic History    Marital status: Single    Number of children: 3   Occupational History    Occupation:    Tobacco Use    Smoking status: Never    Smokeless tobacco: Never   Vaping Use    Vaping status: Never Used    Passive vaping exposure: Yes   Substance and Sexual Activity    Alcohol use: Yes     Alcohol/week: 4.0 standard drinks of alcohol     Types: 4 Standard drinks or equivalent per week     Comment: occasionally    Drug use: No    Sexual activity: Yes     Partners: Female     Birth control/protection: Condom     Social Drivers of Health     Food Insecurity: Low Risk  (10/31/2023)    Received from [facility]    Food Insecurity     Have there been times that your food ran out, and you didn't have money to get more?: No     Are there times that you worry that this might happen?: No   Transportation Needs: Low Risk  (10/31/2023)    Received from [facility]    Transportation Needs     Do you have trouble getting transportation to medical appointments?: No

## 2025-06-05 ENCOUNTER — PATIENT MESSAGE (OUTPATIENT)
Dept: INTERNAL MEDICINE CLINIC | Facility: CLINIC | Age: 55
End: 2025-06-05

## 2025-06-14 ENCOUNTER — LAB ENCOUNTER (OUTPATIENT)
Dept: LAB | Facility: HOSPITAL | Age: 55
End: 2025-06-14
Attending: UROLOGY
Payer: COMMERCIAL

## 2025-06-14 DIAGNOSIS — R97.20 ELEVATED PSA: ICD-10-CM

## 2025-06-14 LAB — PSA SERPL-MCNC: 0.97 NG/ML (ref ?–4)

## 2025-06-14 PROCEDURE — 36415 COLL VENOUS BLD VENIPUNCTURE: CPT

## 2025-06-14 PROCEDURE — 84153 ASSAY OF PSA TOTAL: CPT

## 2025-06-29 DIAGNOSIS — F51.01 PRIMARY INSOMNIA: ICD-10-CM

## 2025-06-30 RX ORDER — ZOLPIDEM TARTRATE 12.5 MG/1
12.5 TABLET, FILM COATED, EXTENDED RELEASE ORAL NIGHTLY PRN
Qty: 30 TABLET | Refills: 0 | Status: SHIPPED | OUTPATIENT
Start: 2025-06-30

## 2025-06-30 NOTE — TELEPHONE ENCOUNTER
Please review. Refill failed protocol.     Recent fills each # 30 : 5/30/25 , #25 : 5/5/25 , #5 : 5/1/25  Last prescription written: 1/30/25  Last office visit:  12/27/2024    Future Appointments   Date Time Provider Department Center   8/15/2025  1:15 PM WILLY AYALA [facility] None

## 2025-07-07 ENCOUNTER — TELEPHONE (OUTPATIENT)
Dept: SURGERY | Facility: CLINIC | Age: 55
End: 2025-07-07

## 2025-07-25 ENCOUNTER — HOSPITAL ENCOUNTER (OUTPATIENT)
Dept: ULTRASOUND IMAGING | Facility: HOSPITAL | Age: 55
Discharge: HOME | End: 2025-07-25
Attending: UROLOGY
Payer: COMMERCIAL

## 2025-07-25 DIAGNOSIS — N20.0 NEPHROLITHIASIS: ICD-10-CM

## 2025-07-25 PROCEDURE — 76770 US EXAM ABDO BACK WALL COMP: CPT | Performed by: UROLOGY

## 2025-07-28 DIAGNOSIS — F51.01 PRIMARY INSOMNIA: ICD-10-CM

## 2025-07-30 DIAGNOSIS — F51.01 PRIMARY INSOMNIA: ICD-10-CM

## 2025-07-30 RX ORDER — ZOLPIDEM TARTRATE 12.5 MG/1
12.5 TABLET, FILM COATED, EXTENDED RELEASE ORAL NIGHTLY PRN
Qty: 10 TABLET | Refills: 0 | Status: SHIPPED | OUTPATIENT
Start: 2025-07-30

## 2025-07-30 RX ORDER — ZOLPIDEM TARTRATE 12.5 MG/1
12.5 TABLET, FILM COATED, EXTENDED RELEASE ORAL NIGHTLY PRN
Qty: 30 TABLET | Refills: 0 | Status: CANCELLED | OUTPATIENT
Start: 2025-07-30

## 2025-08-05 DIAGNOSIS — F51.01 PRIMARY INSOMNIA: ICD-10-CM

## 2025-08-07 ENCOUNTER — TELEPHONE (OUTPATIENT)
Facility: CLINIC | Age: 55
End: 2025-08-07

## 2025-08-07 RX ORDER — ZOLPIDEM TARTRATE 12.5 MG/1
12.5 TABLET, FILM COATED, EXTENDED RELEASE ORAL NIGHTLY PRN
Qty: 30 TABLET | Refills: 0 | Status: SHIPPED | OUTPATIENT
Start: 2025-08-07

## 2025-08-11 RX ORDER — PREGABALIN 75 MG/1
75 CAPSULE ORAL DAILY
COMMUNITY
Start: 2025-08-04

## 2025-08-15 ENCOUNTER — HOSPITAL ENCOUNTER (OUTPATIENT)
Facility: HOSPITAL | Age: 55
Setting detail: HOSPITAL OUTPATIENT SURGERY
Discharge: HOME | End: 2025-08-15
Attending: INTERNAL MEDICINE | Admitting: INTERNAL MEDICINE

## 2025-08-15 ENCOUNTER — ANESTHESIA (OUTPATIENT)
Dept: ENDOSCOPY | Facility: HOSPITAL | Age: 55
End: 2025-08-15

## 2025-08-15 ENCOUNTER — ANESTHESIA EVENT (OUTPATIENT)
Dept: ENDOSCOPY | Facility: HOSPITAL | Age: 55
End: 2025-08-15

## 2025-08-15 VITALS
HEART RATE: 73 BPM | OXYGEN SATURATION: 97 % | BODY MASS INDEX: 24.32 KG/M2 | WEIGHT: 168 LBS | DIASTOLIC BLOOD PRESSURE: 82 MMHG | SYSTOLIC BLOOD PRESSURE: 102 MMHG | HEIGHT: 69.5 IN | RESPIRATION RATE: 16 BRPM

## 2025-08-15 DIAGNOSIS — Z86.0100 HX OF COLONIC POLYPS: ICD-10-CM

## 2025-08-15 DIAGNOSIS — Z12.11 SPECIAL SCREENING FOR MALIGNANT NEOPLASMS, COLON: ICD-10-CM

## 2025-08-15 PROBLEM — Z86.0101 HISTORY OF ADENOMATOUS POLYP OF COLON: Status: ACTIVE | Noted: 2025-08-15

## 2025-08-15 PROCEDURE — 45380 COLONOSCOPY AND BIOPSY: CPT | Performed by: INTERNAL MEDICINE

## 2025-08-15 RX ORDER — SODIUM CHLORIDE, SODIUM LACTATE, POTASSIUM CHLORIDE, CALCIUM CHLORIDE 600; 310; 30; 20 MG/100ML; MG/100ML; MG/100ML; MG/100ML
INJECTION, SOLUTION INTRAVENOUS CONTINUOUS
Status: DISCONTINUED | OUTPATIENT
Start: 2025-08-15 | End: 2025-08-15

## 2025-08-15 RX ORDER — NALOXONE HYDROCHLORIDE 0.4 MG/ML
0.08 INJECTION, SOLUTION INTRAMUSCULAR; INTRAVENOUS; SUBCUTANEOUS ONCE AS NEEDED
Status: DISCONTINUED | OUTPATIENT
Start: 2025-08-15 | End: 2025-08-15

## 2025-08-15 RX ORDER — LIDOCAINE HYDROCHLORIDE 10 MG/ML
INJECTION, SOLUTION EPIDURAL; INFILTRATION; INTRACAUDAL; PERINEURAL AS NEEDED
Status: DISCONTINUED | OUTPATIENT
Start: 2025-08-15 | End: 2025-08-15 | Stop reason: SURG

## 2025-08-15 RX ADMIN — SODIUM CHLORIDE, SODIUM LACTATE, POTASSIUM CHLORIDE, CALCIUM CHLORIDE: 600; 310; 30; 20 INJECTION, SOLUTION INTRAVENOUS at 12:57:00

## 2025-08-15 RX ADMIN — SODIUM CHLORIDE, SODIUM LACTATE, POTASSIUM CHLORIDE, CALCIUM CHLORIDE: 600; 310; 30; 20 INJECTION, SOLUTION INTRAVENOUS at 12:34:00

## 2025-08-15 RX ADMIN — LIDOCAINE HYDROCHLORIDE 50 MG: 10 INJECTION, SOLUTION EPIDURAL; INFILTRATION; INTRACAUDAL; PERINEURAL at 12:36:00

## 2025-08-18 ENCOUNTER — TELEPHONE (OUTPATIENT)
Facility: CLINIC | Age: 55
End: 2025-08-18

## 2025-08-28 DIAGNOSIS — K21.9 GASTROESOPHAGEAL REFLUX DISEASE WITHOUT ESOPHAGITIS: ICD-10-CM

## 2025-09-01 RX ORDER — OMEPRAZOLE 20 MG/1
20 CAPSULE, DELAYED RELEASE ORAL EVERY MORNING
Qty: 90 CAPSULE | Refills: 0 | Status: SHIPPED | OUTPATIENT
Start: 2025-09-01

## (undated) DIAGNOSIS — F51.01 PRIMARY INSOMNIA: ICD-10-CM

## (undated) DEVICE — KIT CLEAN ENDOKIT 1.1OZ GOWNX2

## (undated) DEVICE — TUBING SCT CLR 6FT .25IN MDVC

## (undated) DEVICE — KIT MFLD FOR SPEC COLL

## (undated) DEVICE — Device

## (undated) DEVICE — KIT ENDO ORCAPOD 160/180/190

## (undated) NOTE — Clinical Note
1/30/2017              Suzanne Resendiz        [address] [place] [id number]         To Whom It May Concern,    Suzanne Resendiz was seen at my office today (1/30/17). If you have any questions please contact our office.       Sincerely,    MARILYN

## (undated) NOTE — LETTER
5/29/2018          To Whom It May Concern:    Ho Phillip is currently under my medical care and may not return to work at this time. Please excuse Danyelle Rivera for 2 days. He may return to work on 5/31/2018.     If you require additional information regis

## (undated) NOTE — LETTER
10/12/2023    Den Cheng        [id number] [address]            Dear Den Cheng,      Our records indicate that you are due for an appointment for a Colonoscopy with Gisela Flores MD. Our doctors are booking out about 3-5 months in advance for procedures. Please call our office to schedule a phone screening appointment to plan for the procedure(s). Your medical well-being is important to us. If your insurance requires a referral, please call your primary care office to request one.       Thank you,      The Physicians and Staff at [facility]

## (undated) NOTE — LETTER
6/1/2018          To Whom It May Concern:    Annika Cruz is currently under my medical care. Please excuse him from work missed on 05/31/2018 and 06/01/2018. If you require additional information please contact our office.         Sincerely,    Dee Campuzano

## (undated) NOTE — LETTER
06/18/18        [address]      Dear Clemencia Banuelos,    [facility] records indicate that you have outstanding lab work and or testing that was ordered for you and has not yet been completed:        CMP      TSH W Reflex To Fr

## (undated) NOTE — LETTER
07/07/25        Bernard Frazier  [address] [place] [address]      Dear Bernard,    Our records indicate that you have outstanding lab work and or testing that was ordered for you and has not yet been completed:  US KIDNEY/BLADDER     To provide you with the best possible care, please complete these orders at your earliest convenience. If you have recently completed these orders please disregard this letter.     If you have any questions please call the office at [phone].     Thank you,       Urology Staff

## (undated) NOTE — LETTER
6/29/2021              [address] [place] [id number]     Please excuse patient from work from 6/28/2021 until July for 2021 due to illness.     Sincerely,    Michell Mccurdy MD  [place] , [address], [place] [address]

## (undated) NOTE — ED AVS SNAPSHOT
Ian Rosas   MRN: [id number]    Department:  [facility] Emergency Department   Date of Visit:  7/11/2018           Disclosure     Insurance plans vary and the physician(s) referred by the ER may not be covered by your plan.  Please contact CARE PHYSICIAN AT ONCE OR RETURN IMMEDIATELY TO THE EMERGENCY DEPARTMENT. If you have been prescribed any medication(s), please fill your prescription right away and begin taking the medication(s) as directed.   If you believe that any of the medications

## (undated) NOTE — ED AVS SNAPSHOT
Bran Forrest   MRN: [id number]    Department:  [facility] Emergency Department   Date of Visit:  6/16/2018           Disclosure     Insurance plans vary and the physician(s) referred by the ER may not be covered by your plan.  Please contact CARE PHYSICIAN AT ONCE OR RETURN IMMEDIATELY TO THE EMERGENCY DEPARTMENT. If you have been prescribed any medication(s), please fill your prescription right away and begin taking the medication(s) as directed.   If you believe that any of the medications

## (undated) NOTE — MR AVS SNAPSHOT
[address] [address]  [phone]               Thank you for choosing us for your health care visit with Tanisha Espinal MD.  We are glad to serve you and happy to provide you with this summary Your physician has referred you to a specialist.  Your physician or the clinic staff will provide you with the phone number you should call to schedule your appointment.      If you are confident that your benefit plan will not require a referral or authori Take 1 tablet (50 mg total) by mouth 2 (two) times daily as needed for Pain.    Commonly known as:  ULTRAM                Where to Get Your Medications      These medications were sent to [facility] [address] AT [address] [place] [address] Don’t forget strength training with weights and resistance Set goals and track your progress   You don’t need to join a gym. Home exercises work great.  Put more priority on exercise in your life                    Visit [facility] online at

## (undated) NOTE — Clinical Note
Pt verbalizes improved pain, denies n/v, ambulatory with steady gait, voiding without difficulty, understands d/c intructions